# Patient Record
Sex: FEMALE | Race: WHITE | NOT HISPANIC OR LATINO | Employment: OTHER | URBAN - METROPOLITAN AREA
[De-identification: names, ages, dates, MRNs, and addresses within clinical notes are randomized per-mention and may not be internally consistent; named-entity substitution may affect disease eponyms.]

---

## 2017-03-16 ENCOUNTER — ALLSCRIPTS OFFICE VISIT (OUTPATIENT)
Dept: OTHER | Facility: OTHER | Age: 62
End: 2017-03-16

## 2017-03-16 DIAGNOSIS — Z12.31 ENCOUNTER FOR SCREENING MAMMOGRAM FOR MALIGNANT NEOPLASM OF BREAST: ICD-10-CM

## 2017-03-21 ENCOUNTER — LAB CONVERSION - ENCOUNTER (OUTPATIENT)
Dept: OTHER | Facility: OTHER | Age: 62
End: 2017-03-21

## 2017-03-21 LAB
ADDITIONAL INFORMATION (HISTORICAL): NORMAL
ADEQUACY: (HISTORICAL): NORMAL
COMMENT (HISTORICAL): NORMAL
CYTOTECHNOLOGIST: (HISTORICAL): NORMAL
HPV MRNA E6/E7 (HISTORICAL): NOT DETECTED
INTERPRETATION (HISTORICAL): NORMAL
LMP (HISTORICAL): NORMAL
PREV. BX: (HISTORICAL): NORMAL
PREV. PAP (HISTORICAL): NORMAL
SOURCE (HISTORICAL): NORMAL

## 2017-03-24 ENCOUNTER — GENERIC CONVERSION - ENCOUNTER (OUTPATIENT)
Dept: OTHER | Facility: OTHER | Age: 62
End: 2017-03-24

## 2017-09-12 ENCOUNTER — HOSPITAL ENCOUNTER (OUTPATIENT)
Dept: MAMMOGRAPHY | Facility: MEDICAL CENTER | Age: 62
Discharge: HOME | End: 2017-09-12
Payer: COMMERCIAL

## 2017-09-12 DIAGNOSIS — Z12.31 ENCOUNTER FOR SCREENING MAMMOGRAM FOR MALIGNANT NEOPLASM OF BREAST: ICD-10-CM

## 2017-09-12 PROCEDURE — 77063 BREAST TOMOSYNTHESIS BI: CPT

## 2017-09-12 PROCEDURE — G0202 SCR MAMMO BI INCL CAD: HCPCS

## 2018-01-11 NOTE — RESULT NOTES
Verified Results  * DXA BONE DENSITY SPINE HIP AND PELVIS [id number] 70:68OK Harriet Osgood Order Number: [id number]     Test Name Result Flag Reference   DXA BONE DENSITY SPINE HIP AND PELVIS (Report)     CENTRAL DXA SCAN     CLINICAL HISTORY:  63year old post-menopausal  female risk factors include personal history osteopenia  TECHNIQUE: Bone densitometry was performed using a [employer] [place] C bone densitometer  Regions of interest appear properly placed  There are no obvious fractures or other confounding variables which could limit the study  Significant degenerative or    osteoarthritic changes are noted on the spine image in particular at L4 and less so at L3      COMPARISON: Baseline     RESULTS:    LUMBAR SPINE: L1-L3:   BMD 0 822 gm/cm2   T-score below normal, -1 8 L1 and L2 measure at -2  3    Z-score -0 3     LEFT TOTAL HIP:   BMD 0 807 gm/cm2   T-score below normal, -1 1   Z-score -0 1     LEFT FEMORAL NECK:   BMD 0 709 gm/cm2   T-score below normal, -1 3   Z-score 0 1     Treatment with Fosamax was maintained from 2010 until 2012 and Boniva from 2015 to the present time           ASSESSMENT:   1  Based on the WHO classification, this study identifies mild to moderate osteopenia and the patient is considered at elevated risk for fracture  2  A daily intake of calcium of at least 1200 mg and vitamin D, 800-1000 IU, as well as weight bearing and muscle strengthening exercise, fall prevention and avoidance of tobacco and excessive alcohol intake as basic preventive measures are recommended  3  Repeat DXA scan in 18-24 months as clinically indicated        WHO CLASSIFICATION:   Normal (a T-score of -1 0 or higher)   Low bone mineral density (a T-score of less than -1 0 but higher than -2 5)   Osteoporosis (a T-score of -2 5 or less)   Severe osteoporosis (a T-score of -2 5 or less with a fragility fracture)      Thank you for allowing us the opportunity to participate in your patient care  The expanded DEXA report will no longer be arriving in your mail  If you desire to view the full report please contact [facility] or access the PACS system       Workstation performed: [id number]     TRIN[facility] SCREENING BILATERAL W 3D & CAD [id number] 91:12KF Marcell Nagel Order Number: [id number]     Test Name Result Flag Reference   MAMMO SCREENING BILATERAL W 3D & CAD (Report)     Patient History:   Patient is postmenopausal    No known family history of cancer  Reductions of both breasts, 2007  Excisional biopsy of the right   breast    Patient is a former smoker  Patient's BMI is 21 1  Reason for exam: screening (asymptomatic)  Mammo Screening Bilateral W DBT and CAD: April 5, 2016 - Check In   #: [de-identified]   2D/3D Procedure   3D views: Bilateral MLO view(s) were taken  2D views: Bilateral CC and XCCL view(s) were taken  Technologist: VICKY Sheikh (VICKY)(M)   Prior study comparison: August 4, 2014, bilateral digital    screening mammogram performed at [place] [address]  May 29, 2013, bilateral ultrasound, performed at   [facility]  May 29, 2013, right breast    unilateral diagnostic mammogram, performed at [facility]  January 21, 2013, bilateral mammogram, performed   at [facility] for [facility]  The breast tissue is heterogeneously dense, potentially limiting    the sensitivity of mammography  Patient risk, included in this    report, assists in determining the appropriate screening regimen    (such as 3-D mammography or the inclusion of automated breast    ultrasound or MRI)  3-D mammography may also remain indicated as    screening  A combination of mediolateral oblique 3-D tomographic   slices as well as standard two-dimensional orthogonal images    were obtained       No dominant soft tissue mass, architectural distortion or    suspicious calcifications are noted  The skin and nipple    contours are within normal limits  No evidence of malignancy  No significant changes   when compared with prior studies  ASSESSMENT: BiRad:1 - Negative     Recommendation:   Routine screening mammogram of both breasts in 1 year  A    reminder letter will be sent  8-10% of cancers will be missed on mammography  Management of a    palpable abnormality must be based on clinical grounds  Patients    will be notified of their results via letter from our facility  Accredited by [employer] of Radiology and FDA  Transcription Location: [facility]: [id number]     Risk Value(s):   Tyrer-Cuzick 10 Year: 3 635%, Tyrer-Cuzick Lifetime: 8 758%,    Myriad Table: 1 5%, COLLINS 5 Year: 2 6%, NCI Lifetime: 12 2%   Signed by:    Cheri Hollis MD   4/5/16

## 2018-01-13 VITALS
DIASTOLIC BLOOD PRESSURE: 66 MMHG | SYSTOLIC BLOOD PRESSURE: 118 MMHG | WEIGHT: 146 LBS | HEIGHT: 68 IN | BODY MASS INDEX: 22.13 KG/M2

## 2018-06-21 ENCOUNTER — ANNUAL EXAM (OUTPATIENT)
Dept: GYNECOLOGY | Facility: CLINIC | Age: 63
End: 2018-06-21
Payer: COMMERCIAL

## 2018-06-21 VITALS
BODY MASS INDEX: 21.82 KG/M2 | DIASTOLIC BLOOD PRESSURE: 62 MMHG | WEIGHT: 144 LBS | HEIGHT: 68 IN | SYSTOLIC BLOOD PRESSURE: 100 MMHG

## 2018-06-21 DIAGNOSIS — Z12.31 ENCOUNTER FOR SCREENING MAMMOGRAM FOR MALIGNANT NEOPLASM OF BREAST: ICD-10-CM

## 2018-06-21 DIAGNOSIS — Z78.0 MENOPAUSE: ICD-10-CM

## 2018-06-21 DIAGNOSIS — Z01.419 ENCOUNTER FOR ANNUAL ROUTINE GYNECOLOGICAL EXAMINATION: Primary | ICD-10-CM

## 2018-06-21 DIAGNOSIS — M85.80 OSTEOPENIA, UNSPECIFIED LOCATION: ICD-10-CM

## 2018-06-21 PROCEDURE — 99396 PREV VISIT EST AGE 40-64: CPT | Performed by: NURSE PRACTITIONER

## 2018-06-21 RX ORDER — RASAGILINE 1 MG/1
TABLET ORAL
COMMUNITY
Start: 2011-08-31 | End: 2020-05-26 | Stop reason: SDUPTHER

## 2018-06-21 RX ORDER — IBUPROFEN 200 MG
200 TABLET ORAL EVERY 6 HOURS
COMMUNITY
End: 2019-06-24

## 2018-06-21 RX ORDER — IBANDRONATE SODIUM 150 MG/1
150 TABLET, FILM COATED ORAL
COMMUNITY
Start: 2014-02-20 | End: 2020-12-11 | Stop reason: SDUPTHER

## 2018-06-21 NOTE — PROGRESS NOTES
Gracie Oneill is a 58 y o  female who presents for annual exam  The patient is post menopausal and voices no complaints today  The patient is sexually active  GYN screening history: last pap: was normal and last mammogram: was normal  The patient is not taking hormone replacement therapy  Patient denies post-menopausal vaginal bleeding      The patient participates in regular exercise: yes  The pt  relates that she has not been taking her Boniva monthly due to the fact that she takes her Parkinson's med prior to getting out of bed  History of abnormal Pap smear: no  Family history of breast cancer: no  Past Medical History:   Diagnosis Date    Menopause     Parkinson disease ([facility] [address] )      Family History   Problem Relation Age of Onset    Leukemia Mother     Diabetes Mother     Emphysema Father     Stroke Father      Past Surgical History:   Procedure Laterality Date    APPENDECTOMY      BACK SURGERY  05/03/2018    COLONOSCOPY      HYSTEROSCOPY W/ ENDOMETRIAL ABLATION      SALPINGOOPHORECTOMY Right      Social History     Social History    Marital status: /Civil Union     Spouse name: N/A    Number of children: N/A    Years of education: N/A     Occupational History    Not on file       Social History Main Topics    Smoking status: Not on file    Smokeless tobacco: Not on file    Alcohol use Yes      Comment: SOCIAL    Drug use: Unknown    Sexual activity: Not on file     Other Topics Concern    Not on file     Social History Narrative    No narrative on file       Current Outpatient Prescriptions:     CALCIUM-VITAMIN D PO, Take by mouth, Disp: , Rfl:     carbidopa-levodopa (SINEMET)  mg per tablet, Take 1 tablet by mouth, Disp: , Rfl:     ibandronate (BONIVA) 150 MG tablet, Take 150 mg by mouth, Disp: , Rfl:     rasagiline (AZILECT) 1 MG, Take by mouth, Disp: , Rfl:     Acetaminophen (TYLENOL) 325 MG CAPS, Tylenol, Disp: , Rfl:     ibuprofen (MOTRIN) 200 mg tablet, Take 200 mg by mouth every 6 (six) hours, Disp: , Rfl:     rotigotine (NEUPRO) 8 MG/24HR transdermal patch, Neupro 8 mg/24 hour transdermal 24 hour patch, Disp: , Rfl:       Review of Systems  Constitutional :no fever, feels well, no tiredness, no recent weight gain or loss  Cardiovascular: no complaints of slow or fast heart beat, no chest pain, no palpitations  Respiratory: no complaints of shortness of shortness of breath, no LE  Breasts:no complaints of breast pain, breast lump, or nipple discharge  Gastrointestinal: no complaints of abdominal pain, constipation,nause, vomiting, or diarrhea or bloody stools  Genitourinary : no complaints of dysuria, incontinence, pelvic pain, dysmenorrhea,vaginal discharge or abnormal vaginal bleeding  Integumentary: no complaints of skin rash or lesion,itching or dry skin  Neurological: no complaints of headache,numbness, tingling, dizziness or fainting       Objective      /62   Ht 5' 8 2" (1 732 m)   Wt 65 3 kg (144 lb)   BMI 21 77 kg/m²     General:   appears stated age","cooperative","alert" normal mood and affect   Neck: Neck: normal, supple,trachea midline, no masses   Heart: regular rate and rhythm, S1, S2 normal, no murmur, click, rub or gallop   Lungs: clear to auscultation bilaterally   Breasts: Breast exam :normal, no dimpling or skin changes noted   Abdomen: soft, non-tender, without masses or organomegaly   Vulva: Normal , no lesions   Vagina: normal , no lesions  Mild atrophy   Urethra: normal   Cervix: Normal, no palpable masses A pap smear was not done   Uterus: Normal , non-tender,not enlarged,no palpable masses   Adnexa: Normal, non-tender without fullness or masses                         Assessment   Normal GYN exam  Osteopenia       Plan      All questions answered  Breast self exam technique reviewed and patient encouraged to perform self-exam monthly  Dietary diary  Follow up as needed    Mammogram   The pt  will have a repeat Dexa scan done  Once those results are available we will decide on whether or not she should continue with the use of Boniva monthly  She will be sure to take in 1200 mg  Calcium with 1000 IU Vit D  The pt  Iis UTD with her colonoscopy and iwll due for a repeat in 5 - 6 yrs

## 2018-10-01 ENCOUNTER — HOSPITAL ENCOUNTER (OUTPATIENT)
Dept: BONE DENSITY | Facility: MEDICAL CENTER | Age: 63
Discharge: HOME | End: 2018-10-01
Payer: COMMERCIAL

## 2018-10-01 ENCOUNTER — HOSPITAL ENCOUNTER (OUTPATIENT)
Dept: MAMMOGRAPHY | Facility: MEDICAL CENTER | Age: 63
Discharge: HOME | End: 2018-10-01
Payer: COMMERCIAL

## 2018-10-01 DIAGNOSIS — Z12.31 ENCOUNTER FOR SCREENING MAMMOGRAM FOR MALIGNANT NEOPLASM OF BREAST: ICD-10-CM

## 2018-10-01 DIAGNOSIS — Z78.0 MENOPAUSE: ICD-10-CM

## 2018-10-01 DIAGNOSIS — M85.80 OSTEOPENIA, UNSPECIFIED LOCATION: ICD-10-CM

## 2018-10-01 PROCEDURE — 77063 BREAST TOMOSYNTHESIS BI: CPT

## 2018-10-01 PROCEDURE — 77067 SCR MAMMO BI INCL CAD: CPT

## 2018-10-01 PROCEDURE — 77080 DXA BONE DENSITY AXIAL: CPT

## 2018-10-09 ENCOUNTER — HOSPITAL ENCOUNTER (OUTPATIENT)
Dept: MAMMOGRAPHY | Facility: CLINIC | Age: 63
Discharge: HOME | End: 2018-10-09
Payer: COMMERCIAL

## 2018-10-09 ENCOUNTER — HOSPITAL ENCOUNTER (OUTPATIENT)
Dept: ULTRASOUND IMAGING | Facility: CLINIC | Age: 63
Discharge: HOME | End: 2018-10-09
Payer: COMMERCIAL

## 2018-10-09 DIAGNOSIS — R92.8 ABNORMAL MAMMOGRAM: ICD-10-CM

## 2018-10-09 PROCEDURE — G0279 TOMOSYNTHESIS, MAMMO: HCPCS

## 2018-10-09 PROCEDURE — 76642 ULTRASOUND BREAST LIMITED: CPT

## 2018-10-09 PROCEDURE — 77065 DX MAMMO INCL CAD UNI: CPT

## 2019-03-18 ENCOUNTER — TELEPHONE (OUTPATIENT)
Dept: GYNECOLOGY | Facility: CLINIC | Age: 64
End: 2019-03-18

## 2019-03-18 DIAGNOSIS — R92.8 ABNORMAL MAMMOGRAM: Primary | ICD-10-CM

## 2019-03-18 NOTE — TELEPHONE ENCOUNTER
Patient had Mammo performed on 10/09/18, was recommended 6 Month follow  Will need Mammo diagnostic order / [address] as needed

## 2019-04-05 ENCOUNTER — HOSPITAL ENCOUNTER (OUTPATIENT)
Dept: ULTRASOUND IMAGING | Facility: CLINIC | Age: 64
Discharge: HOME | End: 2019-04-05
Payer: MEDICARE

## 2019-04-05 ENCOUNTER — HOSPITAL ENCOUNTER (OUTPATIENT)
Dept: MAMMOGRAPHY | Facility: CLINIC | Age: 64
Discharge: HOME | End: 2019-04-05
Payer: MEDICARE

## 2019-04-05 VITALS — HEIGHT: 68 IN | WEIGHT: 144 LBS | BODY MASS INDEX: 21.82 KG/M2

## 2019-04-05 DIAGNOSIS — R92.8 ABNORMAL MAMMOGRAM: ICD-10-CM

## 2019-04-05 PROCEDURE — G0279 TOMOSYNTHESIS, MAMMO: HCPCS

## 2019-04-05 PROCEDURE — 77065 DX MAMMO INCL CAD UNI: CPT

## 2019-06-24 ENCOUNTER — ANNUAL EXAM (OUTPATIENT)
Dept: GYNECOLOGY | Facility: CLINIC | Age: 64
End: 2019-06-24
Payer: MEDICARE

## 2019-06-24 VITALS
WEIGHT: 143.4 LBS | BODY MASS INDEX: 21.73 KG/M2 | HEIGHT: 68 IN | HEART RATE: 92 BPM | SYSTOLIC BLOOD PRESSURE: 122 MMHG | DIASTOLIC BLOOD PRESSURE: 76 MMHG

## 2019-06-24 DIAGNOSIS — Z12.31 ENCOUNTER FOR SCREENING MAMMOGRAM FOR MALIGNANT NEOPLASM OF BREAST: ICD-10-CM

## 2019-06-24 DIAGNOSIS — Z01.419 ENCOUNTER FOR GYNECOLOGICAL EXAMINATION (GENERAL) (ROUTINE) WITHOUT ABNORMAL FINDINGS: Primary | ICD-10-CM

## 2019-06-24 PROCEDURE — G0101 CA SCREEN;PELVIC/BREAST EXAM: HCPCS | Performed by: OBSTETRICS & GYNECOLOGY

## 2019-11-06 ENCOUNTER — TELEPHONE (OUTPATIENT)
Dept: NEUROLOGY | Facility: CLINIC | Age: 64
End: 2019-11-06

## 2019-11-06 NOTE — TELEPHONE ENCOUNTER
Consult 1-17-20 2:30pm w/Dr Alex Pines Budinetz/Parkinson/Medicare A & B / Downstream life  Np pack sent

## 2019-11-18 ENCOUNTER — HOSPITAL ENCOUNTER (OUTPATIENT)
Dept: MAMMOGRAPHY | Facility: MEDICAL CENTER | Age: 64
Discharge: HOME | End: 2019-11-18
Payer: MEDICARE

## 2019-11-18 VITALS — BODY MASS INDEX: 21.22 KG/M2 | WEIGHT: 140 LBS | HEIGHT: 68 IN

## 2019-11-18 DIAGNOSIS — Z12.31 ENCOUNTER FOR SCREENING MAMMOGRAM FOR MALIGNANT NEOPLASM OF BREAST: ICD-10-CM

## 2019-11-18 PROCEDURE — 77067 SCR MAMMO BI INCL CAD: CPT

## 2019-11-18 PROCEDURE — 77063 BREAST TOMOSYNTHESIS BI: CPT

## 2019-11-19 ENCOUNTER — TELEPHONE (OUTPATIENT)
Dept: GYNECOLOGY | Facility: CLINIC | Age: 64
End: 2019-11-19

## 2019-11-19 NOTE — TELEPHONE ENCOUNTER
LM on VM to [facility] - Mammography normal however she had an elevated lifetime Tyrer Cuzick risk assessment of 33 83% and should be offered referral to Dr Derick Larsen

## 2019-11-20 ENCOUNTER — TELEPHONE (OUTPATIENT)
Dept: GYNECOLOGY | Facility: CLINIC | Age: 64
End: 2019-11-20

## 2019-11-20 DIAGNOSIS — Z80.3 FAMILY HISTORY OF BREAST CANCER: Primary | ICD-10-CM

## 2019-11-20 NOTE — TELEPHONE ENCOUNTER
Spoke with patient regarding elevated Tyrer-Cuzick lifetime risk and patient is agreeable to appointment with Dr Neeta Azevedo

## 2019-11-22 ENCOUNTER — TELEPHONE (OUTPATIENT)
Dept: SURGICAL ONCOLOGY | Facility: CLINIC | Age: 64
End: 2019-11-22

## 2019-11-22 NOTE — TELEPHONE ENCOUNTER
New Patient Encounter    New Patient Intake Form   Patient Details:  Abad Santana  1955  [id number]    Background Information:  [address] starts by opening a telephone encounter and gathering the following information   Who is calling to schedule? If not self, relationship to patient? self   Referring Provider Esvin Bailey   What is the diagnosis? Elevated tyrer naa risk   When was the diagnosis? 11/2019   Is patient aware of diagnosis? Yes   Reason for visit? NP DX   Have you had any testing done? If so: when, where? Yes   Are records in edo? yes   Was the patient told to bring a disk? no   Scheduling Information:   Preferred [place]:  [place]     Requesting Specific Provider? isa   Are there any dates/time the patient cannot be seen? no      Miscellaneous: n/a   After completing the above information, please route to Financial Counselor and the appropriate Nurse Navigator for review

## 2020-01-06 ENCOUNTER — TELEPHONE (OUTPATIENT)
Dept: HEMATOLOGY ONCOLOGY | Facility: CLINIC | Age: 65
End: 2020-01-06

## 2020-01-06 NOTE — TELEPHONE ENCOUNTER
[place] called in to cancel her appointment with Jerri Cardoso on the 20th  Said she does not feel the need for the appointment right now

## 2020-01-16 NOTE — PROGRESS NOTES
DEPARTMENT OF NEUROLOGICAL SCIENCES  [address] and [facility] CLINIC        NEW PATIENT EVALUATION NOTE    Patient: [address] Record Number: # [id number]  YOB: 1955  Date of visit: 1/17/2020    Referring provider: Ofelia Cormier MD    ASSESSMENT     Diagnoses for this encounter:  1  Parkinson's disease ([address] )       Impression of this 70 yo lady here for transfer of care for 11+ year history of idiopathic Parkinson's disease, finding benefit on Sinemet / rasagiline / rotigotine combination for years  She has more rigidity than tremor and little OFF time with her current regimen  However she feels she has increased cramping pain of late, having had no follow-up in a year  UPDRSIII of 14  Overall she is doing relatively well considering her duration of disease and with few non-motor symptoms  We refilled the Neupro patch for her, as that was something she was able to get for a low cost while she was at [facility]  Rather than increasing medications, we will focus on return of physical activity she had voluntarily reduced several months ago, including stretching for the affected rigidity  In the future given her age, we may consider amantadine as next step, with benefit to her dyskinesias  Rest as below  PLAN     · Reviewed recent blood test results from [facility] as normal CBC, CMP, TSH  No new testing right now needed  · No recent or relevant neuroimaging results to review in [facility]  She had MRI L-spine in 2018 with L foraminal severe stenosis at L4-L5 post laminectomy  · We discussed about focusing on stretching exercises, suggested heating pads and tonic water to assist with cramping  Her ON OFF fluctuations are mild and we will otherwise continue the current regimen she has unchanged (Sinemet, Neupro, and Azilect)  · We have her registered on the Neupro  com site, with a patient savings card emailed to her   She will let us know if she encounters problems with filling it out  · Thank you very much for sending me this interesting patient  · The patient has been instructed to call us about any new neurological problems or medication side effects  · Return to Clinic in 4 months  A total of 60 minutes were spent face-to-face with this patient, of which 25% was spent on counseling and coordination of care  We discussed the natural history of the patient's condition, differential diagnosis, level of diagnostic certainty, treatment alternatives and their side effects and possible complications  HISTORY OF PRESENT ILLNESS:     Ms Eulogio Hardy is a 71 y o  right handed female who has been referred to the [facility] [address] [place] [address] for transfer of care evaluation of parkinsonism  The patient was not accompanied today  History was obtained from patient   Referred from PCP    Patient had been established for many years at [facility] and saw several neurologists there including Dr Woo Barrett and Dr Patsy Stafford  Her last office visit was in 12/2018 and Marjorie Leatha was 6  Per review, her first symptom was R hand tremor since 2009, also with rigidity and pain, limping on R side when walking  A Dr Arsh Valencia started Azilect at first and then a Dr Jen Monsivais began Sinemet, which helped the [facility] tremor  She is on sinemet 25/100 1 tab at 8AM-12 noon-4PM-8PM- 1/2 tab at 12 AM  She feels the medication may not last for four hours  She is weak and tremor during off time  She is about 2-3 hours of off time total in a day  She is slightly having dyskinesia in between  She is not feeling discomfortable  Historically, what bothers her the most was frequent wearing off in between  Dyskinesia is not bothersome  She was started on Neupro patch due to the motor fluctuation  She did not take it until a few weeks ago  She is now at 8mg per day  No major problems  She also feels the motor fluctuation is less       Today she tells me that now she has begun to notice more of the aches, cramps, and rigidity coming back  She says she has been off exercise for the "last couple to months"  She had a recent bike trip in [facility] but had taken a hiatus afterwards  She denies feeling significant freezing of gait but does some shuffle  She denies impulsivity and hallucinations  She does feel some increased leg swelling at the end of the day  She had two back surgeries in the last three years which did set her back temporarily in her parkinson's symptoms  She does have some ON OFF periods, with some rigidity more than tremor during OFF  Current Relevant Medications:  Sinemet 25/100, Boniva, Calcium, rasagiline, rotigotine patch  Name of Med 7am 11pm 3pm 7pm  12am     Sinemet 25/100 IR 1 tab 1 tab 1 tab 1 tab  0 5 optional      Azilect 1mg 1 tab           Rotigotine 8mg/24hr 1 patch            Her doses usually last up to the following dose with little gap time  It takes 20+ minutes to kick in   She feels the 11am time period is usually when she feels the worst      REVIEW OF PAST MEDICAL, SOCIAL AND FAMILY HISTORY:  This is the list of problems as per our Medical Records:    Patient Active Problem List    Diagnosis Date Noted    Parkinson's disease ([facility] [address] ) 01/17/2020    Encounter for annual routine gynecological examination 06/21/2018     Past Medical History:   Diagnosis Date    Menopause     Parkinson disease [facility])       Past Surgical History:   Procedure Laterality Date    APPENDECTOMY      BACK SURGERY  05/03/2018    COLONOSCOPY      HYSTEROSCOPY W/ ENDOMETRIAL ABLATION      MAMMO STEREOTACTIC BREAST BIOPSY RIGHT (ALL INC) Right     benign    REDUCTION MAMMAPLASTY Bilateral     2007    SALPINGOOPHORECTOMY Right       Allergies   Allergen Reactions    Amoxicillin Hives    Penicillins Hives    Sulfa Antibiotics Hives      Outpatient Encounter Medications as of 1/17/2020   Medication Sig Dispense Refill    CALCIUM-VITAMIN D PO Take by mouth      carbidopa-levodopa (SINEMET)  mg per tablet Take 1 tablet by mouth 1 tablet at 7 am, 1 tablet at 11 am, 1 tablet at 3 pm, 1 tablet at 7 pm, 1/2 tablet at bed time   rasagiline (AZILECT) 1 MG Take by mouth      [DISCONTINUED] rotigotine (NEUPRO) 8 MG/24HR transdermal patch Neupro 8 mg/24 hour transdermal 24 hour patch      ibandronate (BONIVA) 150 MG tablet Take 150 mg by mouth       No facility-administered encounter medications on file as of 1/17/2020  Social History     Tobacco Use    Smoking status: Former Smoker    Smokeless tobacco: Never Used   Substance Use Topics    Alcohol use: Yes     Comment: SOCIAL     Family History   Problem Relation Age of Onset    Leukemia Mother     Diabetes Mother     Emphysema Father     Stroke Father     No Known Problems Sister     No Known Problems Daughter     No Known Problems Maternal Grandmother     No Known Problems Maternal Grandfather     No Known Problems Paternal Grandmother     No Known Problems Paternal Grandfather     Breast cancer Sister 54    Breast cancer Sister 54    No Known Problems Maternal Aunt     Cancer Maternal Aunt     No Known Problems Paternal Aunt     No Known Problems Paternal Aunt     No Known Problems Paternal Aunt         REVIEW OF SYSTEMS:  The patient has entered data on an intake form regarding present illness, past medical and surgical history, medications, allergies, family and social history, and a full review of 14 systems  I have reviewed this form with the patient, and all the relevant information has been included on this note  The full review of systems was negative except as stated in HPI and below  Constitutional: Negative  Negative for appetite change and fever  HENT: Negative  Negative for hearing loss, tinnitus, trouble swallowing and voice change  Eyes: Negative  Negative for photophobia and pain  Positive for dry eyes     Respiratory: Negative  Negative for shortness of breath  Cardiovascular: Negative    Negative for palpitations  Gastrointestinal: Negative  Negative for nausea and vomiting  Endocrine: Negative  Negative for cold intolerance and heat intolerance  Genitourinary: Negative  Negative for dysuria, frequency and urgency  Musculoskeletal: Negative  Negative for myalgias and neck pain  Skin: Negative  Negative for rash  Neurological: Positive for tremors  Negative for dizziness, seizures, syncope, facial asymmetry, speech difficulty, weakness, light-headedness, numbness and headaches  Positive for cramping     Hematological: Negative  Does not bruise/bleed easily  Psychiatric/Behavioral: Negative  Negative for confusion, hallucinations and sleep disturbance  PHYSICAL EXAMINATION:     Vital signs:  BP (!) 87/58 (BP Location: Left arm, Patient Position: Standing, Cuff Size: Large)   Pulse 87   Ht 5' 8" (1 727 m)   Wt 63 5 kg (140 lb)   LMP  (LMP Unknown)   BMI 21 29 kg/m²   Orthostatic BP as sitting 107/56 pulse 72, standing 87/58 with pulse 87    General: Mild hypomimia  Well-appearing, well nourished, pleasant patient in no acute distress  Mood appropriate  Head:  Normocephalic, atraumatic  Oropharynx and conjunctiva are clear  Speech  No hypophonia, no bradylalia  No scanning speech  Language: Comprehension intact  Neck:  Supple, strong 5/5 forward flexion and retroflexion  Extremities: Range of motion is normal       Cognitive and Mental Exam:  The patient is alert, oriented to self, location, date and situation  Memory is normal to provide accurate details of health history     Cranial Nerves:  CN II:  Direct and consensual light reflexes were equally reactive to light symmetrically  No afferent pupillary defect   Visual fields are full to confrontation  CN III / IV / VI: Extraocular movements were full, with normal pursuit and saccades  CN V:   Facial sensation to light touch was intact  CN VII: Face is symmetric with normal strength     CN VIII: Hearing was assessed using the Calibrated Finger Rub Auditory Screening Test (CALFRAST) and was not abnormal (Better than CALFRAST-Strong-70)  CN X:   Palate is up going bilaterally and symmetrically  CN XI:  Neck muscles are strong  CN XII: Tongue protrusion is at midline with normal movements  No dysarthria  Motor:    Dystonia: none  Dyskinesia: yes full body  Myoclonus: none  Chorea: none  Tics: none       UPDRSIII                Time since last dose:   1/17/20  At usual OFF time      Speech  0     Facial Expression  1    Postural Tremor (Right) 1    Postural Tremor (Left) 1    Kinetic Tremor (Right)  0    Kinetic Tremor (Left)  0    Rest tremor amplitude RUE 0    Rest tremor amplitude LUE 0    Rest tremor amplitude RLE 0    Rest tremor amplitude LLE 0    Lip/Jaw Tremor  0    Consistency of tremor 0    Finger Taps (Right)   0    Finger Taps (Left)  1    Hand Movement (Right)  0    Hand Movement (Left)   0    Pronation/Supination (Right)  1    Pronation/Supination (Left)   1    Toe Tapping (Right) 0    Toe Tapping (Left) 2    Leg Agility (Right)  0    Leg Agility (Left)   0     Rigidity - Neck  2     Rigidity - Upper Extremity (Right)  3      Rigidity - Upper Extremity (Left)   1     Rigidity - Lower Extremity (Right)  0    Rigidity - Lower Extremity (Left)   0    Arising from Chair   0      Gait   0     Freezing of Gait 0     Postural Stability  0     Posture 0     Global spontaneity of movement 0       -------------------------------------------------------------------------------------    Muscle Strength Right Left  Muscle Strength Right Left   Deltoid 5/5 5/5  Hip Adductors 5/5 5/5   Biceps 5/5 5/5  Hip Abductors 5/5 5/5   Triceps 5/5 5/5  Knee Extensors 5/5 5/5   Wrist Extensors 5/5 5/5  Knee Flexors 5/5 5/5   Wrist Flexors 5/5 5/5  Ankle Extensors 5/5 5/5    5/5 5/5  Ankle Flexors 5/5 5/5   Finger Abductors 5/5 5/5       Hip Flexors 5/5 5/5   Hip Extensors 5/5 5/5     Sensory  Intact to Light Touch, Temperature, and vibration sense in all extremities  Coordination:  Finger-to-nose-finger: normal     Gait:  Normal speed and rise from bench, symmetrical arm swing, no resting tremor, narrow base and normal turns  Pull test of 0 (one step back)        Reflexes:    Right Left   Biceps 2/4 2/4   Brachioradialis 2/4 2/4   Triceps 2/4 2/4   Knee 2/4 2/4   Ankle 2/4 2/4      Plantar cutaneous reflex:  Right: flexor  Left: flexor

## 2020-01-17 ENCOUNTER — CONSULT (OUTPATIENT)
Dept: NEUROLOGY | Facility: CLINIC | Age: 65
End: 2020-01-17
Payer: MEDICARE

## 2020-01-17 VITALS
DIASTOLIC BLOOD PRESSURE: 58 MMHG | HEART RATE: 87 BPM | SYSTOLIC BLOOD PRESSURE: 87 MMHG | WEIGHT: 140 LBS | BODY MASS INDEX: 21.22 KG/M2 | HEIGHT: 68 IN

## 2020-01-17 DIAGNOSIS — G20 PARKINSON DISEASE (HCC): Primary | ICD-10-CM

## 2020-01-17 DIAGNOSIS — G20 PARKINSON'S DISEASE (HCC): Primary | ICD-10-CM

## 2020-01-17 PROBLEM — G20.A1 PARKINSON'S DISEASE: Status: ACTIVE | Noted: 2020-01-17

## 2020-01-17 PROCEDURE — 99204 OFFICE O/P NEW MOD 45 MIN: CPT | Performed by: PSYCHIATRY & NEUROLOGY

## 2020-01-17 NOTE — PATIENT INSTRUCTIONS
· Reviewed recent blood test results from [facility] as normal CBC, CMP, TSH  No new testing right now needed  · No recent or relevant neuroimaging results to review in [facility]  She had MRI L-spine in 2018 with L foraminal severe stenosis at L4-L5 post laminectomy  · We discussed about focusing on stretching exercises, suggested heating pads and tonic water to assist with cramping  Her ON OFF fluctuations are mild and we will otherwise continue the current regimen she has unchanged (Sinemet, Neupro, and Azilect)  · We have her registered on the [employer]  com site, with a patient savings card emailed to her  She will let us know if she encounters problems with filling it out  · Thank you very much for sending me this interesting patient  · The patient has been instructed to call us about any new neurological problems or medication side effects  · Return to Clinic in 4 months

## 2020-01-17 NOTE — LETTER
January 17, 2020     Julia Medrano MD  [facility],Third Floor, [address] [place] [address]    Patient: Brook Cisse   YOB: 1955   Date of Visit: 1/17/2020       Dear Dr Zeus Davis: Thank you for referring Lashay Mcdonnell to me for evaluation  Below are my notes for this consultation  If you have questions, please do not hesitate to call me  I look forward to following your patient along with you  Sincerely,        Keyshawn Page MD        CC: No Recipients  Keyshawn Page MD  1/17/2020  7:54 PM  Incomplete  DEPARTMENT OF NEUROLOGICAL SCIENCES  ST [address] [place] [address] and [facility]        NEW PATIENT EVALUATION NOTE    Patient: [address] [place] Record Number: # [id number]  YOB: 1955  Date of visit: 1/17/2020    Referring provider: Dyana Villa MD    ASSESSMENT     Diagnoses for this encounter:  1  Parkinson's disease ([address] )       Impression of this 72 yo lady here for transfer of care for 11+ year history of idiopathic Parkinson's disease, finding benefit on Sinemet / rasagiline / rotigotine combination for years  She has more rigidity than tremor and little OFF time with her current regimen  However she feels she has increased cramping pain of late, having had no follow-up in a year  UPDRSIII of 14  Overall she is doing relatively well considering her duration of disease and with few non-motor symptoms  We refilled the Neupro patch for her, as that was something she was able to get for a low cost while she was at [facility]  Rather than increasing medications, we will focus on return of physical activity she had voluntarily reduced several months ago, including stretching for the affected rigidity  In the future given her age, we may consider amantadine as next step, with benefit to her dyskinesias  Rest as below  PLAN     · Reviewed recent blood test results from [facility] as normal CBC, CMP, TSH   No new testing right now needed  · No recent or relevant neuroimaging results to review in [facility]  She had MRI L-spine in 2018 with L foraminal severe stenosis at L4-L5 post laminectomy  · We discussed about focusing on stretching exercises, suggested heating pads and tonic water to assist with cramping  Her ON OFF fluctuations are mild and we will otherwise continue the current regimen she has unchanged (Sinemet, Neupro, and Azilect)  · We have her registered on the Neupro  com site, with a patient savings card emailed to her  She will let us know if she encounters problems with filling it out  · Thank you very much for sending me this interesting patient  · The patient has been instructed to call us about any new neurological problems or medication side effects  · Return to Clinic in 4 months  A total of 60 minutes were spent face-to-face with this patient, of which 25% was spent on counseling and coordination of care  We discussed the natural history of the patient's condition, differential diagnosis, level of diagnostic certainty, treatment alternatives and their side effects and possible complications  HISTORY OF PRESENT ILLNESS:     Ms Ramona Mercado is a 71 y o  right handed female who has been [facility] to [facility] [address] [place] [address] for transfer of care evaluation of parkinsonism  The patient was not accompanied today  History was obtained from patient   Referred from PCP    Patient had been established for many years at [facility] AT THE [facility] and saw several neurologists there including Dr Candie Lo and Dr Mariama Hernandez  Her last office visit was in 12/2018 and Billye Post was 6  Per review, her first symptom was R hand tremor since 2009, also with rigidity and pain, limping on R side when walking  A Dr Don Caraballo started Azilect at first and then a Dr Adrian Sanon began Sinemet, [facility]       She is on sinemet 25/100 1 tab at 8AM-12 noon-4PM-8PM- 1/2 tab at 12 AM  She feels the medication may not last for four hours  She is weak and tremor during off time  She is about 2-3 hours of off time total in a day  She is slightly having dyskinesia in between  She is not feeling discomfortable  Historically, what bothers her the most was frequent wearing off in between  Dyskinesia is not bothersome  She was started on Neupro patch due to the motor fluctuation  She did not take it until a few weeks ago  She is now at 8mg per day  No major problems  She also feels the motor fluctuation is less  Today she tells me that now she has begun to notice more of the aches, cramps, and rigidity coming back  She says she has been off exercise for the "last couple to months"  She had a recent bike trip in [facility] but had taken a hiatus afterwards  She denies feeling significant freezing of gait but does some shuffle  She denies impulsivity and hallucinations  She does feel some increased leg swelling at the end of the day  She had two back surgeries in the last three years which did set her back temporarily in her parkinson's symptoms  She does have some ON OFF periods, with some rigidity more than tremor during OFF  Current Relevant Medications:  Sinemet 25/100, Boniva, Calcium, rasagiline, rotigotine patch  Name of Med 7am 11pm 3pm 7pm  12am     Sinemet 25/100 IR 1 tab 1 tab 1 tab 1 tab  0 5 optional      Azilect 1mg 1 tab           Rotigotine 8mg/24hr 1 patch            Her doses usually last up to the following dose with little gap time  It takes 20+ minutes to kick in   She feels the 11am time period is usually when she feels the worst      REVIEW OF PAST MEDICAL, SOCIAL AND FAMILY HISTORY:  This is the list of problems as per our Medical Records:    Patient Active Problem List    Diagnosis Date Noted    Parkinson's disease ([facility] [address] [facility] ) 01/17/2020    Encounter for annual routine gynecological examination 06/21/2018     Past Medical History:   Diagnosis Date    Menopause     Parkinson disease [facility])       Past Surgical History:   Procedure Laterality Date    APPENDECTOMY      BACK SURGERY  05/03/2018    COLONOSCOPY      HYSTEROSCOPY W/ ENDOMETRIAL ABLATION      MAMMO STEREOTACTIC BREAST BIOPSY RIGHT (ALL INC) Right     benign    REDUCTION MAMMAPLASTY Bilateral     2007    SALPINGOOPHORECTOMY Right       Allergies   Allergen Reactions    Amoxicillin Hives    Penicillins Hives    Sulfa Antibiotics Hives      Outpatient Encounter Medications as of 1/17/2020   Medication Sig Dispense Refill    CALCIUM-VITAMIN D PO Take by mouth      carbidopa-levodopa (SINEMET)  mg per tablet Take 1 tablet by mouth 1 tablet at 7 am, 1 tablet at 11 am, 1 tablet at 3 pm, 1 tablet at 7 pm, 1/2 tablet at bed time   rasagiline (AZILECT) 1 MG Take by mouth      [DISCONTINUED] rotigotine (NEUPRO) 8 MG/24HR transdermal patch Neupro 8 mg/24 hour transdermal 24 hour patch      ibandronate (BONIVA) 150 MG tablet Take 150 mg by mouth       No facility-administered encounter medications on file as of 1/17/2020        Social History     Tobacco Use    Smoking status: Former Smoker    Smokeless tobacco: Never Used   Substance Use Topics    Alcohol use: Yes     Comment: SOCIAL     Family History   Problem Relation Age of Onset    Leukemia Mother     Diabetes Mother     Emphysema Father     Stroke Father     No Known Problems Sister     No Known Problems Daughter     No Known Problems Maternal Grandmother     No Known Problems Maternal Grandfather     No Known Problems Paternal Grandmother     No Known Problems Paternal Grandfather     Breast cancer Sister 54    Breast cancer Sister 54    No Known Problems Maternal Aunt     Cancer Maternal Aunt     No Known Problems Paternal Aunt     No Known Problems Paternal Aunt     No Known Problems Paternal Aunt         REVIEW OF SYSTEMS:  The patient has entered data on an intake form regarding present illness, past medical and surgical history, medications, allergies, family and social history, and a full review of 14 systems  I have reviewed this form with the patient, and all the relevant information has been included on this note  The full review of systems was negative except as stated in HPI and below  Constitutional: Negative  Negative for appetite change and fever  HENT: Negative  Negative for hearing loss, tinnitus, trouble swallowing and voice change  Eyes: Negative  Negative for photophobia and pain  Positive for dry eyes     Respiratory: Negative  Negative for shortness of breath  Cardiovascular: Negative  Negative for palpitations  Gastrointestinal: Negative  Negative for nausea and vomiting  Endocrine: Negative  Negative for cold intolerance and heat intolerance  Genitourinary: Negative  Negative for dysuria, frequency and urgency  Musculoskeletal: Negative  Negative for myalgias and neck pain  Skin: Negative  Negative for rash  Neurological: Positive for tremors  Negative for dizziness, seizures, syncope, facial asymmetry, speech difficulty, weakness, light-headedness, numbness and headaches  Positive for cramping     Hematological: Negative  Does not bruise/bleed easily  Psychiatric/Behavioral: Negative  Negative for confusion, hallucinations and sleep disturbance  PHYSICAL EXAMINATION:     Vital signs:  BP (!) 87/58 (BP Location: Left arm, Patient Position: Standing, Cuff Size: Large)   Pulse 87   Ht 5' 8" (1 727 m)   Wt 63 5 kg (140 lb)   LMP  (LMP Unknown)   BMI 21 29 kg/m²    Orthostatic BP as sitting 107/56 pulse 72, standing 87/58 with pulse 87    General: Mild hypomimia  Well-appearing, well nourished, pleasant patient in no acute distress  Mood appropriate  Head:  Normocephalic, atraumatic  Oropharynx and conjunctiva are clear  Speech  No hypophonia, no bradylalia  No scanning speech  Language: Comprehension intact  Neck:  Supple, strong 5/5 forward flexion and retroflexion     Extremities: Range of motion is normal       Cognitive and Mental Exam:  The patient is alert, oriented to self, location, date and situation  Memory is normal to provide accurate details of health history     Cranial Nerves:  CN II:  Direct and consensual light reflexes were equally reactive to light symmetrically  No afferent pupillary defect   Visual fields are full to confrontation  CN III / IV / VI: Extraocular movements were full, with normal pursuit and saccades  CN V:   Facial sensation to light touch was intact  CN VII: Face is symmetric with normal strength  CN VIII: Hearing was assessed using the Calibrated Finger Rub Auditory Screening Test (CALFRAST) and was not abnormal (Better than CALFRAST-Strong-70)  CN X:   Palate is up going bilaterally and symmetrically  CN XI:  Neck muscles are strong  CN XII: Tongue protrusion is at midline with normal movements  No dysarthria  Motor:    Dystonia: none  Dyskinesia: yes full body  Myoclonus: none  Chorea: none  Tics: none       UPDRSIII                Time since last dose:    1/17/20  At usual OFF time      Speech  0     Facial Expression  1    Postural Tremor (Right) 1    Postural Tremor (Left) 1    Kinetic Tremor (Right)  0    Kinetic Tremor (Left)  0    Rest tremor amplitude RUE 0    Rest tremor amplitude LUE 0    Rest tremor amplitude RLE 0    Rest tremor amplitude LLE 0    Lip/Jaw Tremor  0    Consistency of tremor 0    Finger Taps (Right)   0    Finger Taps (Left)  1    Hand Movement (Right)  0    Hand Movement (Left)   0    Pronation/Supination (Right)  1    Pronation/Supination (Left)   1    Toe Tapping (Right) 0    Toe Tapping (Left) 2    Leg Agility (Right)  0    Leg Agility (Left)   0     Rigidity - Neck  2     Rigidity - Upper Extremity (Right)  3      Rigidity - Upper Extremity (Left)   1     Rigidity - Lower Extremity (Right)  0    Rigidity - Lower Extremity (Left)   0    Arising from Chair   0      Gait    0     Freezing of Gait 0     Postural Stability  0     Posture 0     Global spontaneity of movement 0       -------------------------------------------------------------------------------------    Muscle Strength Right Left  Muscle Strength Right Left   Deltoid 5/5 5/5  Hip Adductors 5/5 5/5   Biceps 5/5 5/5  Hip Abductors 5/5 5/5   Triceps 5/5 5/5  Knee Extensors 5/5 5/5   Wrist Extensors 5/5 5/5  Knee Flexors 5/5 5/5   Wrist Flexors 5/5 5/5  Ankle Extensors 5/5 5/5    5/5 5/5  Ankle Flexors 5/5 5/5   Finger Abductors 5/5 5/5       Hip Flexors 5/5 5/5   Hip Extensors 5/5 5/5     Sensory  Intact to Light Touch, Temperature, and vibration sense in all extremities  Coordination:  Finger-to-nose-finger: normal     Gait:  Normal speed and rise from bench, symmetrical arm swing, no resting tremor, narrow base and normal turns  Pull test of 0 (one step back)  Reflexes:    Right Left   Biceps 2/4 2/4   Brachioradialis 2/4 2/4   Triceps 2/4 2/4   Knee 2/4 2/4   Ankle 2/4 2/4      Plantar cutaneous reflex:  Right: flexor  Left: flexor      Bandar Wang  1/17/2020  2:33 PM  Sign at close encounter  Review of Systems   Constitutional: Negative  Negative for appetite change and fever  HENT: Negative  Negative for hearing loss, tinnitus, trouble swallowing and voice change  Eyes: Negative  Negative for photophobia and pain  Positive for dry eyes     Respiratory: Negative  Negative for shortness of breath  Cardiovascular: Negative  Negative for palpitations  Gastrointestinal: Negative  Negative for nausea and vomiting  Endocrine: Negative  Negative for cold intolerance and heat intolerance  Genitourinary: Negative  Negative for dysuria, frequency and urgency  Musculoskeletal: Negative  Negative for myalgias and neck pain  Skin: Negative  Negative for rash  Neurological: Positive for tremors   Negative for dizziness, seizures, syncope, facial asymmetry, speech difficulty, weakness, light-headedness, numbness and headaches  Positive for cramping     Hematological: Negative  Does not bruise/bleed easily  Psychiatric/Behavioral: Negative  Negative for confusion, hallucinations and sleep disturbance  Keyshawn Page MD  1/17/2020  7:50 PM  Sign at close encounter  [facility] and [facility]        NEW PATIENT EVALUATION NOTE    Patient: [address] [place] Record Number: # [id number]  YOB: 1955  Date of visit: 1/17/2020    Referring provider: Dyana Villa MD    ASSESSMENT     Diagnoses for this encounter:  1  Parkinson's disease ([address] )       Impression of this 70 yo lady here for transfer of care for 11+ year history of idiopathic Parkinson's disease, finding benefit on Sinemet / rasagiline / rotigotine combination for years  She has more rigidity than tremor and little OFF time with her current regimen  However she feels she has increased cramping pain of late, having had no follow-up in a year  UPDRSIII of 14  Overall she is doing relatively well considering her duration of disease and with few non-motor symptoms  We refilled the Neupro patch for her, as that was something she was able to get for a low cost while she was at [facility]  Rest as below  PLAN     · Reviewed recent blood test results from [facility] as normal CBC, CMP, TSH  No new testing right now needed  · No recent or relevant neuroimaging results to review in [facility]  She had MRI L-spine in 2018 with L foraminal severe stenosis at L4-L5 post laminectomy  · We discussed about focusing on stretching exercises, suggested heating pads and tonic water to assist with cramping  Her ON OFF fluctuations are mild and we will otherwise continue the current regimen she has unchanged (Sinemet, Neupro, and Azilect)  · We have her registered on the Ne[employer]  com site, with a patient savings card emailed to her   She will let us know if she encounters problems with filling it out  · Thank you very much for sending me this interesting patient  · The patient has been instructed to call us about any new neurological problems or medication side effects  · Return to Clinic in 4 months  A total of 60 minutes were spent face-to-face with this patient, of which 25% was spent on counseling and coordination of care  We discussed the natural history of the patient's condition, differential diagnosis, level of diagnostic certainty, treatment alternatives and their side effects and possible complications  HISTORY OF PRESENT ILLNESS:     Ms Cyndi Kolb is a 71 y o  right handed female who has been referred to the [facility] [address] [place] [address] for transfer of care evaluation of parkinsonism  The patient was not accompanied today  History was obtained from patient   Referred from PCP    Patient had been established for many years at [facility] THE [facility] and saw several neurologists there including Dr Marianna Andrade and Dr Jonelle Mckeon  Her last office visit was in 12/2018 and Angelava Ballesteros was 6  Per review, her first symptom was R hand tremor since 2009, also with rigidity and pain, limping on R side when walking  A Dr Tanya Cheek started Azilect at first and then a Dr Janna Chris began Sinemet, which helped the [facility] tremor  She is on sinemet 25/100 1 tab at 8AM-12 noon-4PM-8PM- 1/2 tab at 12 AM  She feels the medication may not last for four hours  She is weak and tremor during off time  She is about 2-3 hours of off time total in a day  She is slightly having dyskinesia in between  She is not feeling discomfortable  Historically, what bothers her the most was frequent wearing off in between  Dyskinesia is not bothersome  She was started on Neupro patch due to the motor fluctuation  She did not take it until a few weeks ago  She is now at 8mg per day  No major problems  She also feels the motor fluctuation is less       Today she tells me that now she has begun to notice more of the aches, cramps, and rigidity coming back  She says she has been off exercise for the "last couple to months"  She had a recent bike trip in [facility] but had taken a hiatus afterwards  She denies feeling significant freezing of gait but does some shuffle  She denies impulsivity and hallucinations  She does feel some increased leg swelling at the end of the day  She had two back surgeries in the last three years which did set her back temporarily in her parkinson's symptoms  She does have some ON OFF periods, with some rigidity more than tremor during OFF  Current Relevant Medications:  Sinemet 25/100, Boniva, Calcium, rasagiline, rotigotine patch  Name of Med 7am 11pm 3pm 7pm  12am     Sinemet 25/100 IR 1 tab 1 tab 1 tab 1 tab  0 5 optional      Azilect 1mg 1 tab           Rotigotine 8mg/24hr 1 patch            Her doses usually last up to the following dose with little gap time  It takes 20+ minutes to kick in   She feels the 11am time period is usually when she feels the worst      REVIEW OF PAST MEDICAL, SOCIAL AND FAMILY HISTORY:  This is the list of problems as per our Medical Records:    Patient Active Problem List    Diagnosis Date Noted    Parkinson's disease ([facility] [address] ) 01/17/2020    Encounter for annual routine gynecological examination 06/21/2018     Past Medical History:   Diagnosis Date    Menopause     Parkinson disease [facility])       Past Surgical History:   Procedure Laterality Date    APPENDECTOMY      BACK SURGERY  05/03/2018    COLONOSCOPY      HYSTEROSCOPY W/ ENDOMETRIAL ABLATION      MAMMO STEREOTACTIC BREAST BIOPSY RIGHT (ALL INC) Right     benign    REDUCTION MAMMAPLASTY Bilateral     2007    SALPINGOOPHORECTOMY Right       Allergies   Allergen Reactions    Amoxicillin Hives    Penicillins Hives    Sulfa Antibiotics Hives      Outpatient Encounter Medications as of 1/17/2020   Medication Sig Dispense Refill    CALCIUM-VITAMIN D PO Take by mouth      carbidopa-levodopa (SINEMET)  mg per tablet Take 1 tablet by mouth 1 tablet at 7 am, 1 tablet at 11 am, 1 tablet at 3 pm, 1 tablet at 7 pm, 1/2 tablet at bed time   rasagiline (AZILECT) 1 MG Take by mouth      [DISCONTINUED] rotigotine (NEUPRO) 8 MG/24HR transdermal patch Neupro 8 mg/24 hour transdermal 24 hour patch      ibandronate (BONIVA) 150 MG tablet Take 150 mg by mouth       No facility-administered encounter medications on file as of 1/17/2020  Social History     Tobacco Use    Smoking status: Former Smoker    Smokeless tobacco: Never Used   Substance Use Topics    Alcohol use: Yes     Comment: SOCIAL     Family History   Problem Relation Age of Onset    Leukemia Mother     Diabetes Mother     Emphysema Father     Stroke Father     No Known Problems Sister     No Known Problems Daughter     No Known Problems Maternal Grandmother     No Known Problems Maternal Grandfather     No Known Problems Paternal Grandmother     No Known Problems Paternal Grandfather     Breast cancer Sister 54    Breast cancer Sister 54    No Known Problems Maternal Aunt     Cancer Maternal Aunt     No Known Problems Paternal Aunt     No Known Problems Paternal Aunt     No Known Problems Paternal Aunt         REVIEW OF SYSTEMS:  The patient has entered data on an intake form regarding present illness, past medical and surgical history, medications, allergies, family and social history, and a full review of 14 systems  I have reviewed this form with the patient, and all the relevant information has been included on this note  The full review of systems was negative except as stated in HPI and below  Constitutional: Negative  Negative for appetite change and fever  HENT: Negative  Negative for hearing loss, tinnitus, trouble swallowing and voice change  Eyes: Negative  Negative for photophobia and pain  Positive for dry eyes     Respiratory: Negative  Negative for shortness of breath  Cardiovascular: Negative    Negative for palpitations  Gastrointestinal: Negative  Negative for nausea and vomiting  Endocrine: Negative  Negative for cold intolerance and heat intolerance  Genitourinary: Negative  Negative for dysuria, frequency and urgency  Musculoskeletal: Negative  Negative for myalgias and neck pain  Skin: Negative  Negative for rash  Neurological: Positive for tremors  Negative for dizziness, seizures, syncope, facial asymmetry, speech difficulty, weakness, light-headedness, numbness and headaches  Positive for cramping     Hematological: Negative  Does not bruise/bleed easily  Psychiatric/Behavioral: Negative  Negative for confusion, hallucinations and sleep disturbance  PHYSICAL EXAMINATION:     Vital signs:  BP (!) 87/58 (BP Location: Left arm, Patient Position: Standing, Cuff Size: Large)   Pulse 87   Ht 5' 8" (1 727 m)   Wt 63 5 kg (140 lb)   LMP  (LMP Unknown)   BMI 21 29 kg/m²    Orthostatic BP as sitting 107/56 pulse 72, standing 87/58 with pulse 87    General: Mild hypomimia  Well-appearing, well nourished, pleasant patient in no acute distress  Mood appropriate  Head:  Normocephalic, atraumatic  Oropharynx and conjunctiva are clear  Speech  No hypophonia, no bradylalia  No scanning speech  Language: Comprehension intact  Neck:  Supple, strong 5/5 forward flexion and retroflexion  Extremities: Range of motion is normal       Cognitive and Mental Exam:  The patient is alert, oriented to self, location, date and situation  Memory is normal to provide accurate details of health history     Cranial Nerves:  CN II:  Direct and consensual light reflexes were equally reactive to light symmetrically  No afferent pupillary defect   Visual fields are full to confrontation  CN III / IV / VI: Extraocular movements were full, with normal pursuit and saccades  CN V:   Facial sensation to light touch was intact  CN VII: Face is symmetric with normal strength     CN VIII: Hearing was assessed using the Calibrated Finger Rub Auditory Screening Test (CALFRAST) and was not abnormal (Better than CALFRAST-Strong-70)  CN X:   Palate is up going bilaterally and symmetrically  CN XI:  Neck muscles are strong  CN XII: Tongue protrusion is at midline with normal movements  No dysarthria  Motor:    Dystonia: none  Dyskinesia: yes full body  Myoclonus: none  Chorea: none  Tics: none       UPDRSIII                Time since last dose:    1/17/20  At usual OFF time      Speech  0     Facial Expression  1    Postural Tremor (Right) 1    Postural Tremor (Left) 1    Kinetic Tremor (Right)  0    Kinetic Tremor (Left)  0    Rest tremor amplitude RUE 0    Rest tremor amplitude LUE 0    Rest tremor amplitude RLE 0    Rest tremor amplitude LLE 0    Lip/Jaw Tremor  0    Consistency of tremor 0    Finger Taps (Right)   0    Finger Taps (Left)  1    Hand Movement (Right)  0    Hand Movement (Left)   0    Pronation/Supination (Right)  1    Pronation/Supination (Left)   1    Toe Tapping (Right) 0    Toe Tapping (Left) 2    Leg Agility (Right)  0    Leg Agility (Left)   0     Rigidity - Neck  2     Rigidity - Upper Extremity (Right)  3      Rigidity - Upper Extremity (Left)   1     Rigidity - Lower Extremity (Right)  0    Rigidity - Lower Extremity (Left)   0    Arising from Chair   0      Gait    0     Freezing of Gait 0     Postural Stability  0     Posture 0     Global spontaneity of movement 0       -------------------------------------------------------------------------------------    Muscle Strength Right Left  Muscle Strength Right Left   Deltoid 5/5 5/5  Hip Adductors 5/5 5/5   Biceps 5/5 5/5  Hip Abductors 5/5 5/5   Triceps 5/5 5/5  Knee Extensors 5/5 5/5   Wrist Extensors 5/5 5/5  Knee Flexors 5/5 5/5   Wrist Flexors 5/5 5/5  Ankle Extensors 5/5 5/5    5/5 5/5  Ankle Flexors 5/5 5/5   Finger Abductors 5/5 5/5       Hip Flexors 5/5 5/5   Hip Extensors 5/5 5/5     Sensory  Intact to Light Touch, Temperature, and vibration sense in all extremities  Coordination:  Finger-to-nose-finger: normal     Gait:  Normal speed and rise from bench, symmetrical arm swing, no resting tremor, narrow base and normal turns  Pull test of 0 (one step back)        Reflexes:    Right Left   Biceps 2/4 2/4   Brachioradialis 2/4 2/4   Triceps 2/4 2/4   Knee 2/4 2/4   Ankle 2/4 2/4      Plantar cutaneous reflex:  Right: flexor  Left: flexor

## 2020-01-17 NOTE — PROGRESS NOTES
Review of Systems   Constitutional: Negative  Negative for appetite change and fever  HENT: Negative  Negative for hearing loss, tinnitus, trouble swallowing and voice change  Eyes: Negative  Negative for photophobia and pain  Positive for dry eyes     Respiratory: Negative  Negative for shortness of breath  Cardiovascular: Negative  Negative for palpitations  Gastrointestinal: Negative  Negative for nausea and vomiting  Endocrine: Negative  Negative for cold intolerance and heat intolerance  Genitourinary: Negative  Negative for dysuria, frequency and urgency  Musculoskeletal: Negative  Negative for myalgias and neck pain  Skin: Negative  Negative for rash  Neurological: Positive for tremors  Negative for dizziness, seizures, syncope, facial asymmetry, speech difficulty, weakness, light-headedness, numbness and headaches  Positive for cramping     Hematological: Negative  Does not bruise/bleed easily  Psychiatric/Behavioral: Negative  Negative for confusion, hallucinations and sleep disturbance

## 2020-05-26 ENCOUNTER — OFFICE VISIT (OUTPATIENT)
Dept: NEUROLOGY | Facility: CLINIC | Age: 65
End: 2020-05-26
Payer: MEDICARE

## 2020-05-26 VITALS
DIASTOLIC BLOOD PRESSURE: 60 MMHG | WEIGHT: 144.8 LBS | TEMPERATURE: 97.5 F | SYSTOLIC BLOOD PRESSURE: 129 MMHG | HEART RATE: 68 BPM | BODY MASS INDEX: 21.95 KG/M2 | HEIGHT: 68 IN

## 2020-05-26 DIAGNOSIS — G20 PARKINSON'S DISEASE (HCC): Primary | ICD-10-CM

## 2020-05-26 PROCEDURE — 99214 OFFICE O/P EST MOD 30 MIN: CPT | Performed by: PSYCHIATRY & NEUROLOGY

## 2020-05-26 RX ORDER — RASAGILINE 1 MG/1
TABLET ORAL
Qty: 90 EACH | Refills: 1 | Status: SHIPPED | OUTPATIENT
Start: 2020-05-26 | End: 2020-12-22

## 2020-05-26 RX ORDER — MULTIVITAMIN
TABLET ORAL
COMMUNITY
End: 2022-07-28

## 2020-08-19 NOTE — RESULT NOTES
Verified Results  THINPREP TIS AND HPV mRNA E6/E7 [id number] 46:52FF Josue Aguila     Test Name Result Flag Reference   CLINICAL INFORMATION:      Routine exam   LMP:      NONE GIVEN   PREV  PAP:      NONE GIVEN   PREV  BX:      NONE GIVEN   SOURCE:      Cervix, Endocervix   STATEMENT OF ADEQUACY:      Satisfactory for evaluation  Endocervical/transformation zone component  present  INTERPRETATION/RESULT:      Negative for intraepithelial lesion or malignancy  COMMENT:      This Pap test has been evaluated with computer  assisted technology  CYTOTECHNOLOGIST:      TOM SONG(ASCP)  CT screening location: [address] [place] [address]   HPV mRNA E6/E7 Not Detected  Not Detected   This test was performed using the APTIMA HPV Assay ([employer] )  This assay detects E6/E7 viral messenger RNA (mRNA) from 14  high-risk HPV types (16,18,31,33,35,39,45,51,52,56,58,59,66,68)  no

## 2020-12-11 ENCOUNTER — ANNUAL EXAM (OUTPATIENT)
Dept: GYNECOLOGY | Facility: CLINIC | Age: 65
End: 2020-12-11
Payer: MEDICARE

## 2020-12-11 VITALS
BODY MASS INDEX: 22.28 KG/M2 | DIASTOLIC BLOOD PRESSURE: 66 MMHG | HEART RATE: 84 BPM | WEIGHT: 147 LBS | HEIGHT: 68 IN | SYSTOLIC BLOOD PRESSURE: 118 MMHG

## 2020-12-11 DIAGNOSIS — M85.80 OSTEOPENIA, UNSPECIFIED LOCATION: ICD-10-CM

## 2020-12-11 DIAGNOSIS — Z01.419 ENCOUNTER FOR GYNECOLOGICAL EXAMINATION (GENERAL) (ROUTINE) WITHOUT ABNORMAL FINDINGS: Primary | ICD-10-CM

## 2020-12-11 DIAGNOSIS — Z78.0 MENOPAUSE: ICD-10-CM

## 2020-12-11 DIAGNOSIS — Z01.419 ENCOUNTER FOR GYNECOLOGICAL EXAMINATION WITH PAPANICOLAOU SMEAR OF CERVIX: ICD-10-CM

## 2020-12-11 DIAGNOSIS — Z13.820 SCREENING FOR OSTEOPOROSIS: ICD-10-CM

## 2020-12-11 DIAGNOSIS — Z12.31 SCREENING MAMMOGRAM, ENCOUNTER FOR: ICD-10-CM

## 2020-12-11 PROCEDURE — G0145 SCR C/V CYTO,THINLAYER,RESCR: HCPCS | Performed by: OBSTETRICS & GYNECOLOGY

## 2020-12-11 PROCEDURE — G0101 CA SCREEN;PELVIC/BREAST EXAM: HCPCS | Performed by: OBSTETRICS & GYNECOLOGY

## 2020-12-11 RX ORDER — PREDNISONE 1 MG/1
TABLET ORAL
COMMUNITY
End: 2021-12-22

## 2020-12-11 RX ORDER — NAPROXEN SODIUM 220 MG
TABLET ORAL AS NEEDED
COMMUNITY

## 2020-12-15 LAB
LAB AP GYN PRIMARY INTERPRETATION: NORMAL
Lab: NORMAL

## 2020-12-17 DIAGNOSIS — G20 PARKINSON'S DISEASE (HCC): ICD-10-CM

## 2020-12-22 RX ORDER — RASAGILINE 1 MG/1
TABLET ORAL
Qty: 90 TABLET | Refills: 1 | Status: SHIPPED | OUTPATIENT
Start: 2020-12-22 | End: 2021-06-23 | Stop reason: SDUPTHER

## 2021-01-05 ENCOUNTER — HOSPITAL ENCOUNTER (OUTPATIENT)
Dept: BONE DENSITY | Facility: MEDICAL CENTER | Age: 66
Discharge: HOME | End: 2021-01-05
Payer: MEDICARE

## 2021-01-05 DIAGNOSIS — M85.80 OSTEOPENIA, UNSPECIFIED LOCATION: ICD-10-CM

## 2021-01-05 DIAGNOSIS — Z13.820 SCREENING FOR OSTEOPOROSIS: ICD-10-CM

## 2021-01-05 DIAGNOSIS — Z78.0 MENOPAUSE: ICD-10-CM

## 2021-01-05 PROCEDURE — 77080 DXA BONE DENSITY AXIAL: CPT

## 2021-01-08 ENCOUNTER — NURSE TRIAGE (OUTPATIENT)
Dept: OTHER | Facility: OTHER | Age: 66
End: 2021-01-08

## 2021-01-08 NOTE — TELEPHONE ENCOUNTER
Exposure guidelines from [facility] not met  Patient's  is having some nondescript symptoms but is getting tested  Advised that if he tests positive then she would meet the exposure guidelines and [facility] can test her  Verbalized understanding  Will call back if needed  Reason for Disposition   [1] Caller concerned that exposure to COVID-19 occurred BUT [2] does not meet COVID-19 EXPOSURE criteria from [facility]    Answer Assessment - Initial Assessment Questions  1  COVID-19 CLOSE CONTACT: "Who is the person with the confirmed or suspected COVID-19 infection that you were exposed to?"      friend  2  PLACE of CONTACT: "Where were you when you were exposed to COVID-19?" (e g , home, school, medical waiting room; which city?)      Friend's house  3  TYPE of CONTACT: "How much contact was there?" (e g , sitting next to, live in same house, work in same office, same building)      Outside, 6ft or less for just a couple of minutes  4  DURATION of CONTACT: "How long were you in contact with the COVID-19 patient?" (e g , a few seconds, passed by person, a few minutes, 15 minutes or longer, live with the patient)      Less than 15 mins  5  MASK: "Were you wearing a mask?" "Was the other person wearing a mask?" Note: wearing a mask reduces the risk of an otherwise close contact  denies  6  DATE of CONTACT: "When did you have contact with a COVID-19 patient?" (e g , how many days ago)      12/31/2020  7  COMMUNITY SPREAD: "Are there lots of cases of COVID-19 (community spread) where you live?" (See public health department website, if unsure)        widespread  8  SYMPTOMS: "Do you have any symptoms?" (e g , fever, cough, breathing difficulty, loss of taste or smell)      denies  10  HIGH RISK: "Do you have any heart or lung problems?  Do you have a weak immune system?" (e g , heart failure, COPD, asthma, HIV positive, chemotherapy, renal failure, diabetes mellitus, sickle cell anemia, obesity)        Parkinson's disease  11  TRAVEL: "Have you traveled out of the country recently?" If so, "When and where?" Also ask about out-of-state travel, since the CDC has identified some high-risk cities for community spread in the [address]  Note: Travel becomes less relevant if there is widespread community transmission where the patient lives          denies    Protocols used: CORONAVIRUS (COVID-19 ) EXPOSURE-ADULT-[place]

## 2021-01-08 NOTE — TELEPHONE ENCOUNTER
Regardin/2 covid direct exposure, no sympt   ----- Message from Feliciano Engel sent at 2021  1:16 PM EST -----   covid test requested - patient was exposed on    No symptoms

## 2021-02-25 ENCOUNTER — HOSPITAL ENCOUNTER (OUTPATIENT)
Dept: MAMMOGRAPHY | Facility: IMAGING CENTER | Age: 66
Discharge: HOME | End: 2021-02-25
Payer: MEDICARE

## 2021-02-25 VITALS — BODY MASS INDEX: 21.98 KG/M2 | HEIGHT: 68 IN | WEIGHT: 145 LBS

## 2021-02-25 DIAGNOSIS — Z12.31 SCREENING MAMMOGRAM, ENCOUNTER FOR: ICD-10-CM

## 2021-02-25 PROCEDURE — 77067 SCR MAMMO BI INCL CAD: CPT

## 2021-02-25 PROCEDURE — 77063 BREAST TOMOSYNTHESIS BI: CPT

## 2021-02-26 ENCOUNTER — TELEPHONE (OUTPATIENT)
Dept: GYNECOLOGY | Facility: CLINIC | Age: 66
End: 2021-02-26

## 2021-02-26 DIAGNOSIS — Z91.89 AT HIGH RISK FOR BREAST CANCER: ICD-10-CM

## 2021-02-26 DIAGNOSIS — Z80.3 FAMILY HISTORY OF BREAST CANCER: Primary | ICD-10-CM

## 2021-02-26 DIAGNOSIS — R92.2 BREAST DENSITY: ICD-10-CM

## 2021-02-26 NOTE — TELEPHONE ENCOUNTER
LM on VM to [facility] - mammography reveals very dense breast  I would like her to follow through with referral for Dr Micky Browning office that we have discussed in the past secondary to densities, elevated TC score and family hx

## 2021-02-26 NOTE — TELEPHONE ENCOUNTER
Spoke with patient regarding findings on mammo  Another referral placed for Dr Jocelyn Hawley office and number for [facility] [place] given  ABUS ordered

## 2021-03-01 ENCOUNTER — TELEPHONE (OUTPATIENT)
Dept: SURGICAL ONCOLOGY | Facility: CLINIC | Age: 66
End: 2021-03-01

## 2021-03-01 NOTE — TELEPHONE ENCOUNTER
New Patient Breast Form   Patient Details:     Genny Albarran     1955     [id number]     Background Information:   [address] starts by opening a telephone encounter and gathering the following information   Who is calling to schedule and relationship?  self   Referring Provider Mabel Wilson   To which speciality is the referral?  Surgical Oncology   Reason for Visit? new   Tumor Type?  dense breast tissue, family history of breast ca   Is there a confimed diagnosis from biopsy/tissue reviewed by Pathology? No   Date of Tissue Diagnosis (If done outside of [facility] please obtain report and slides) na   Is patient aware of diagnosis, and who made them aware? Yes   If no tissue diagnosis, please stop and discuss with Navigator prior to scheduling   When was the diagnosis made? 2/26   Were outside slides requested  No   If biopsy done externally, obtain reports and slides for internal review   Have you had any imaging or labs done? Yes   If YES, when and  where was the blood work done? [facility]   If outside of [facility] obtain records   Was the patient told to bring a disk? no   Are records in EPIC? yes   Is there a personal history of cancer? no   If YES please list type and YR diagnosed na   If patient has a prior history of breast cancer were old records obtained? No   Have you ever had genetic testing done for breast cancer? If so, can you please bring a copy to appointment for timely treatment planning No   Is there a family history of cancer? yes   If YES please list type Two sister breast ca   Does the patient smoke or Vape? no   If yes, how many packs or cartridges per day? na   Scheduling Information:   [facility]   Are there any days the patient cannot be seen? na   Miscellaneous:   After completing the above information, please route to finance, nurse navigation and clinical trials for review

## 2021-03-31 ENCOUNTER — CONSULT (OUTPATIENT)
Dept: NEUROLOGY | Facility: CLINIC | Age: 66
End: 2021-03-31
Payer: MEDICARE

## 2021-03-31 VITALS
DIASTOLIC BLOOD PRESSURE: 53 MMHG | BODY MASS INDEX: 22.2 KG/M2 | WEIGHT: 146 LBS | HEART RATE: 89 BPM | SYSTOLIC BLOOD PRESSURE: 108 MMHG

## 2021-03-31 DIAGNOSIS — G20 PARKINSON'S DISEASE (HCC): Primary | ICD-10-CM

## 2021-03-31 PROCEDURE — 99214 OFFICE O/P EST MOD 30 MIN: CPT | Performed by: PSYCHIATRY & NEUROLOGY

## 2021-03-31 NOTE — PROGRESS NOTES
Patient ID: Abad Santana is a 76 y o  female  Assessment/Plan:    Parkinson's disease ([address] )  Parkinsonian fairly well controlled on current regimen  She has developed some wearing off about 20 minutes prior to any each dose, along with intermittent dyskinesias  Dyskinesias not bothersome at this point  Time spent discussing options for reducing off time given dyskinesias would consider adding amantadine or a longer-acting version of amantadine such as ago call every are also lacks in an attempt to reduce off time and help dampen dyskinesias  Other off since such as entacapone/ opicapone or Patricia Laming can also be added to help reduce off time  The options may in fact increased dyskinesias so this would be something we would have to her monitor  We also discussed surgical options for reducing off time and disease including deep brain stimulation surgery an MRI focused ultrasound  At this point she does meet criteria for deep brain stimulation surgery as she has motor fluctuations with dyskinesias and is responsive to levodopa  We discussed the surgical procedure, potential benefits, and risks  Questions regarding MRI focused ultrasound were also answered  At this point in time she is functioning well  She has opted to keep her medications the same as she wishes to remain on the lowest dose of medication needed at any given point in time  She will contact me if there is any issue prior to the next visit  Diagnoses and all orders for this visit:    Parkinson's disease [facility])           Subjective:    Ms Gabino Galvan is woman with Parkinson's disease who presents to the Movement disorder clinic for follow up  This is my first visit with the patient as she was previously followed by Dr Vanessa Mota  Review of notes reveal symptoms began in 2009 with right hand res tremor, rigidity and limping on the right side   She was diagnosed and followed at [address] [place] [address] neurology for many years and seen by several neurologists and movement specialist up until 2018  Azilect was started and then Sinemet was added which improved tremor  Overtime she developed fluctuations with wearing off and dyskinesia and Neupro patch was added with improvement  She was also seen at [facility] for biomedical research projects with Dr Estephania Evans  She has undergone two back surgeries and right torn bicep  Current Relevant Medications:   Sinemet 25/100 IR 1 tab at 8am, 12, 4pm, 8pm and 1/2 tab qhs (1am)  Azilect 1mg daily            Rotigotine 8mg/24hr    Am onset about 20 minutes              She can tell a dose is wearing off about 20 minutes prior to the next dose due to legs starting to feel heavy, tremor and stiffness  She can typically wait this out  Right rest tremor is present at times  Speech can be hesitant  No drooling  Chewing and swallowing without difficulty  Eating tasks, dressing, and hygiene tasks without difficulty  Handwriting is sloppy and micrographic when off  Hobbies and activities - 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  She rides a bicycle when she can  She has not been as active during covid  Arising out of chair without difficulty  Walking and balance is unchanged  She is not walking as much as prior  She has a bunion which impairs distance  She had been seeing a podiatrist who retired  She uses toe separaters  Freezing-none   Turning in bed  without difficulty  Sleepwell  No daytime sedation   Urinary problems-no  Constipation- no   Lightheadedness on standing  every once in a ahile  Cognitive impairment - no issues  Psychosis/ hallucinations- no     Dyskinesia- mild at time but overall less frequent  Objective:    Blood pressure 108/53, pulse 89, weight 66 2 kg (146 lb), not currently breastfeeding  Physical Exam  Eyes:      Extraocular Movements: Extraocular movements intact  Pupils: Pupils are equal, round, and reactive to light     Neurological: Mental Status: She is alert  Deep Tendon Reflexes: Strength normal          Neurological Exam  Mental Status  Alert  Oriented to person, place, time and situation  Speech: hypophonia  Language is fluent with no aphasia  Attention and concentration are normal     Cranial Nerves  CN III, IV, VI: Extraocular movements intact bilaterally  Pupils equal round and reactive to light bilaterally  CN VII: Full and symmetric facial movement  CN VIII: Hearing is normal   CN XI: Shoulder shrug strength is normal   CN XII: Tongue midline without atrophy or fasciculations  Motor   Normal muscle tone  Strength is 5/5 throughout all four extremities  Sensory  Light touch is normal in upper and lower extremities  Coordination  Right: Finger-to-nose normal  Rapid alternating movement abnormality:  Left: Finger-to-nose normal  Rapid alternating movement abnormality:  See MDS UPDRS III  Gait  Casual gait is normal including stance, stride, and arm swing   Normal pull test  Able to rise from chair without using arms        MDS UPDRS III                              3/31/21   Time since last dose: 1 h 20 min   Speech  1   Facial Expression  2   Rigidity - Neck  0   Rigidity - Upper Extremity (Right)  0   Rigidity - Upper Extremity (Left)   0   Rigidity - Lower Extremity (Right)  0   Rigidity - Lower Extremity (Left)   1   Finger Taps (Right)   0   Finger Taps (Left)   1   Hand Movement (Right)  0   Hand Movement (Left)   0   Pronation/Supination (Right)  0   Pronation/Supination (Left)   1   Toe Tapping (Right) 0   Toe Tapping (Left) 1   Leg Agility (Right)  0   Leg Agility (Left)   0   Arising from Chair   0   Gait   0   Freezing of Gait 0   Postural Stability   0   Posture 0   Global spontaneity of movement 1   Postural Tremor (Right) 1   Postural Tremor (Left) 0   Kinetic Tremor (Right)  0   Kinetic Tremor (Left)  0   Rest tremor amplitude RUE 1   Rest tremor amplitude LUE 0   Rest tremor amplitude RLE 0   Reset tremor amplitude LLE 0   Lip/Jaw Tremor  0   Consistency of tremor 1   Motor Exam Total:          ROS:    Review of Systems    Constitutional: Negative  Negative for appetite change and fever  HENT: Negative  Negative for hearing loss, tinnitus, trouble swallowing and voice change  Eyes: Negative  Negative for photophobia and pain  Respiratory: Negative  Negative for shortness of breath  Cardiovascular: Negative  Negative for palpitations  Gastrointestinal: Negative  Negative for nausea and vomiting  Endocrine: Negative  Negative for cold intolerance  Genitourinary: Negative  Negative for dysuria, frequency and urgency  Musculoskeletal: Positive for back pain, gait problem (Shuffling feet) and myalgias  Negative for neck pain  Skin: Negative  Negative for rash  Allergic/Immunologic: Negative  Neurological: Positive for tremors (Mostly Right arm) and weakness  Negative for dizziness, seizures, syncope, facial asymmetry, speech difficulty, light-headedness, numbness and headaches  Hematological: Negative  Does not bruise/bleed easily  Psychiatric/Behavioral: Negative  Negative for confusion, hallucinations and sleep disturbance  All other systems reviewed and are negative  Review of system was personally reviewed

## 2021-03-31 NOTE — PROGRESS NOTES
Review of Systems   Constitutional: Negative  Negative for appetite change and fever  HENT: Negative  Negative for hearing loss, tinnitus, trouble swallowing and voice change  Eyes: Negative  Negative for photophobia and pain  Respiratory: Negative  Negative for shortness of breath  Cardiovascular: Negative  Negative for palpitations  Gastrointestinal: Negative  Negative for nausea and vomiting  Endocrine: Negative  Negative for cold intolerance  Genitourinary: Negative  Negative for dysuria, frequency and urgency  Musculoskeletal: Positive for back pain, gait problem (Shuffling feet) and myalgias  Negative for neck pain  Skin: Negative  Negative for rash  Allergic/Immunologic: Negative  Neurological: Positive for tremors (Mostly Right arm) and weakness  Negative for dizziness, seizures, syncope, facial asymmetry, speech difficulty, light-headedness, numbness and headaches  Hematological: Negative  Does not bruise/bleed easily  Psychiatric/Behavioral: Negative  Negative for confusion, hallucinations and sleep disturbance  All other systems reviewed and are negative

## 2021-03-31 NOTE — PATIENT INSTRUCTIONS
Will continue on current medications  If you develop increase off time we can consider adding amantadine/ Gocovri/ Osmolex  Please contact us if there is any issue prior to the next visit

## 2021-03-31 NOTE — ASSESSMENT & PLAN NOTE
Parkinsonian fairly well controlled on current regimen  She has developed some wearing off about 20 minutes prior to any each dose, along with intermittent dyskinesias  Dyskinesias not bothersome at this point  Time spent discussing options for reducing off time given dyskinesias would consider adding amantadine or a longer-acting version of amantadine such as ago call every are also lacks in an attempt to reduce off time and help dampen dyskinesias  Other off since such as entacapone/ opicapone or Figueroa Silverio can also be added to help reduce off time  The options may in fact increased dyskinesias so this would be something we would have to her monitor  We also discussed surgical options for reducing off time and disease including deep brain stimulation surgery an MRI focused ultrasound  At this point she does meet criteria for deep brain stimulation surgery as she has motor fluctuations with dyskinesias and is responsive to levodopa  We discussed the surgical procedure, potential benefits, and risks  Questions regarding MRI focused ultrasound were also answered  At this point in time she is functioning well  She has opted to keep her medications the same as she wishes to remain on the lowest dose of medication needed at any given point in time  She will contact me if there is any issue prior to the next visit

## 2021-04-30 ENCOUNTER — CONSULT (OUTPATIENT)
Dept: SURGICAL ONCOLOGY | Facility: CLINIC | Age: 66
End: 2021-04-30
Payer: MEDICARE

## 2021-04-30 VITALS
WEIGHT: 148 LBS | HEIGHT: 68 IN | RESPIRATION RATE: 16 BRPM | TEMPERATURE: 97.9 F | SYSTOLIC BLOOD PRESSURE: 100 MMHG | BODY MASS INDEX: 22.43 KG/M2 | HEART RATE: 89 BPM | DIASTOLIC BLOOD PRESSURE: 80 MMHG

## 2021-04-30 DIAGNOSIS — Z91.89 AT HIGH RISK FOR BREAST CANCER: ICD-10-CM

## 2021-04-30 DIAGNOSIS — R92.2 BREAST DENSITY: ICD-10-CM

## 2021-04-30 DIAGNOSIS — Z12.31 VISIT FOR SCREENING MAMMOGRAM: ICD-10-CM

## 2021-04-30 DIAGNOSIS — R92.2 DENSE BREASTS: Primary | ICD-10-CM

## 2021-04-30 DIAGNOSIS — Z80.3 FAMILY HISTORY OF BREAST CANCER: ICD-10-CM

## 2021-04-30 DIAGNOSIS — Z12.39 ENCOUNTER FOR BREAST CANCER SCREENING OTHER THAN MAMMOGRAM: ICD-10-CM

## 2021-04-30 PROBLEM — R92.30 DENSE BREASTS: Status: ACTIVE | Noted: 2021-04-30

## 2021-04-30 PROCEDURE — 99204 OFFICE O/P NEW MOD 45 MIN: CPT | Performed by: NURSE PRACTITIONER

## 2021-04-30 NOTE — PATIENT INSTRUCTIONS
Breast Self Exam for Women   AMBULATORY CARE:   A breast self-exam (BSE)  is a way to check your breasts for lumps and other changes  Regular BSEs can help you know how your breasts normally look and feel  Most breast lumps or changes are not cancer, but you should always have them checked by a healthcare provider  Why you should do a BSE:  Breast cancer is the most common type of cancer in women  Even if you have mammograms, you may still want to do a BSE regularly  If you know how your breasts normally feel and look, it may help you know when to contact your healthcare provider  Mammograms can miss some cancers  You may find a lump during a BSE that did not show up on a mammogram   When you should do a BSE:  If you have periods, you may want to do your BSE 1 week after your period ends  This is the time when your breasts may be the least swollen, lumpy, or tender  You can do regular BSEs even if you are breastfeeding or have breast implants  Call your doctor if:   · You find any lumps or changes in your breasts  · You have breast pain or fluid coming from your nipples  · You have questions or concerns about your condition or care  How to do a BSE:       · Look at your breasts in a mirror  Look at the size and shape of each breast and nipple  Check for swelling, lumps, dimpling, scaly skin, or other skin changes  Look for nipple changes, such as a nipple that is painful or beginning to pull inward  Gently squeeze both nipples and check to see if fluid (that is not breast milk) comes out of them  If you find any of these or other breast changes, contact your healthcare provider  Check your breasts while you sit or  the following 3 positions:    ? Hang your arms down at your sides  ? Raise your hands and join them behind your head  ? Put firm pressure with your hands on your hips  Bend slightly forward while you look at your breasts in the mirror  · Lie down and feel your breasts    When you lie down, your breast tissue spreads out evenly over your chest  This makes it easier for you to feel for lumps and anything that may not be normal for your breasts  Do a BSE on one breast at a time  ? Place a small pillow or towel under your left shoulder  Put your left arm behind your head  ? Use the 3 middle fingers of your right hand  Use your fingertip pads, on the top of your fingers  Your fingertip pad is the most sensitive part of your finger  ? Use small circles to feel your breast tissue  Use your fingertip pads to make dime-sized, overlapping circles on your breast and armpits  Use light, medium, and firm pressure  First, press lightly  Second, press with medium pressure to feel a little deeper into the breast  Last, use firm pressure to feel deep within your breast     ? Examine your entire breast area  Examine the breast area from above the breast to below the breast where you feel only ribs  Make small circles with your fingertips, starting in the middle of your armpit  Make circles going up and down the breast area  Continue toward your breast and all the way across it  Examine the area from your armpit all the way over to the middle of your chest (breastbone)  Stop at the middle of your chest     ? Move the pillow or towel to your right shoulder, and put your right arm behind your head  Use the 3 fingertip pads of your left hand, and repeat the above steps to do a BSE on your right breast   What else you can do to check for breast problems or cancer:  Talk to your healthcare provider about mammograms  A mammogram is an x-ray of your breasts to screen for breast cancer or other problems  Your provider can tell you the benefits and risks of mammograms  The first mammogram is usually at age 39 or 48  Your provider may recommend you start at 36 or younger if your risk for breast cancer is high  Mammograms usually continue every 1 to 2 years until age 76       Follow up with your doctor as directed:  Write down your questions so you remember to ask them during your visits  © Copyright [address] 2021 Information is for End User's use only and may not be sold, redistributed or otherwise used for commercial purposes  All illustrations and images included in CareNotes® are the copyrighted property of [employer]  or Jac Franco  The above information is an  only  It is not intended as medical advice for individual conditions or treatments  Talk to your doctor, nurse or pharmacist before following any medical regimen to see if it is safe and effective for you

## 2021-04-30 NOTE — PROGRESS NOTES
Surgical Oncology Follow Up       [facility]  [facility] [id number]    Austyn Griffith  1955  [id number]      Chief Complaint   Patient presents with    Consult     Pt is here for initial consultation F[facility]x breast cancer        Assessment/Plan:  1  Family history of breast cancer    2  At high risk for breast cancer  - Recommended CBE q 6 mo, annual 3d mammography, annual 3d screening mammogram  - Consume healthy diet, exercise regularly, maintain healthy weight  - 1 year f/u visit    3  Breast density    4  Dense breasts  - US breast screening bilateral complete (ABUS); Future    5  Encounter for breast cancer screening other than mammogram  - US breast screening bilateral complete (ABUS); Future    6  Visit for screening mammogram  - Mammo screening bilateral w 3d & cad; Future      Discussion/Summary:   Patient is a 79-year-old female that presents today for consultation regarding an increased risk of breast cancer, dense breasts and a family history of breast cancer  Two of her sisters were diagnosed with DCIS in their 62s  I reviewed with the patient that she would not qualify for genetic testing coverage however she does have the option to pursue genetic testing with out-of-pocket coverage  Patient is not currently interested in a meeting with oncology Genetics  There are no concerns on her exam today  She had a bilateral mammogram in February which was BI-RADS 1, category 4 density  Her lifetime TC risk was calculated at 30 3%  I reviewed risk reducing measures with her    I am recommending an automated breast ultrasound in addition to her annual 3D mammogram   I will order this for August   She will be seeing her gynecologist in the fall will receive a clinical exam at that time so I will plan to see her back in 1 year, following her mammogram   She was instructed to call with any changes on self-exam   She is in agreement with these recommendations  All of her questions were answered today  History of Present Illness:     -Interval History: Patient is a 58-year-old female that presents today for a consultation regarding a family history of breast cancer and dense breasts  Two of her three sisters have been diagnosed with DCIS in their 62s  Neither sister has undergone genetic testing  Her maternal aunt was diagnosed with leukemia  Patient has no personal history of cancer  She is not of Ashkenazi Judaism descent  She had a bilateral 3D screening mammogram on February 25, 2021 which was BI-RADS 1, category 4 density  He lifetime risk was estimated at 33 19%  She had a benign right breast biopsy in the past  She underwent breast reductions (Dr Kristian Moore) in 2007  Menarche age 14, 11 pregnancies, 3 live births  She was 29 at the time her 1st child was born  She has used birth control pills and the Norplant in the past   She has never used hormone replacement therapy  She is a former smoker, quitting in [address] and consumes approximately 15 alcoholic beverages per week  Review of Systems:  Review of Systems   Constitutional: Negative for activity change, appetite change, chills, fatigue, fever and unexpected weight change  Respiratory: Negative for cough and shortness of breath  Cardiovascular: Negative for chest pain  Gastrointestinal: Negative for abdominal pain, constipation, diarrhea, nausea and vomiting  Musculoskeletal: Negative for arthralgias, back pain, gait problem and myalgias  Skin: Negative for color change and rash  Neurological: Negative for dizziness and headaches  Parkinson's disease   Hematological: Negative for adenopathy  Psychiatric/Behavioral: Negative for agitation and confusion  All other systems reviewed and are negative        Patient Active Problem List   Diagnosis    Encounter for annual routine gynecological examination    Parkinson's disease ([facility] [address] )    Family history of breast cancer     Past Medical History:   Diagnosis Date    Menopause     Parkinson disease [facility])      Past Surgical History:   Procedure Laterality Date    APPENDECTOMY      BACK SURGERY  05/03/2018    BREAST BIOPSY Right 2002    COLONOSCOPY      HYSTEROSCOPY W/ ENDOMETRIAL ABLATION      MAMMO STEREOTACTIC BREAST BIOPSY RIGHT (ALL INC) Right     benign    REDUCTION MAMMAPLASTY Bilateral     2007    SALPINGOOPHORECTOMY Right      Family History   Problem Relation Age of Onset   Gamal [place] Leukemia Mother     Diabetes Mother     Emphysema Father     Stroke Father     No Known Problems Sister     No Known Problems Daughter     No Known Problems Maternal Grandmother     No Known Problems Maternal Grandfather     No Known Problems Paternal Grandmother     No Known Problems Paternal Grandfather     Breast cancer Sister 54    Breast cancer Sister 54    No Known Problems Maternal Aunt     Cancer Maternal Aunt     No Known Problems Paternal Aunt     No Known Problems Paternal Aunt     No Known Problems Paternal Aunt      Social History     Socioeconomic History    Marital status: /Civil Union     Spouse name: Not on file    Number of children: Not on file    Years of education: Not on file    Highest education level: Not on file   Occupational History    Not on file   Social Needs    Financial resource strain: Not on file    Food insecurity     Worry: Not on file     Inability: Not on file   [employer] needs     Medical: Not on file     Non-medical: Not on file   Tobacco Use    Smoking status: Former Smoker    Smokeless tobacco: Never Used   Substance and Sexual Activity    Alcohol use: Yes     Comment: SOCIAL    Drug use: Never    Sexual activity: Yes   Lifestyle    Physical activity     Days per week: Not on file     Minutes per session: Not on file    Stress: Not on file   Relationships    Social connections     Talks on phone: Not on file     Gets together: Not on file     Attends Adventism service: Not on file     Active member of club or organization: Not on file     Attends meetings of clubs or organizations: Not on file     Relationship status: Not on file    Intimate partner violence     Fear of current or ex partner: Not on file     Emotionally abused: Not on file     Physically abused: Not on file     Forced sexual activity: Not on file   Other Topics Concern    Not on file   Social History Narrative    Not on file       Current Outpatient Medications:     CALCIUM-VITAMIN D PO, Take by mouth, Disp: , Rfl:     carbidopa-levodopa (SINEMET)  mg per tablet, TAKE 1 TABLET AT 8AM,12,4,AND 7:30 PM THEN 1/2 TABLET AT BEDTIME, Disp: 420 tablet, Rfl: 1    Multiple Vitamin (MULTI-VITAMIN DAILY) TABS, Take by mouth, Disp: , Rfl:     naproxen sodium (Aleve) 220 MG tablet, as needed , Disp: , Rfl:     rasagiline (AZILECT) 1 MG, TAKE 1 TABLET BY MOUTH EVERY DAY, Disp: 90 tablet, Rfl: 1    rotigotine (Neupro) 8 MG/24HR transdermal patch, Place 1 patch on the skin daily, Disp: 90 patch, Rfl: 3    predniSONE 5 mg tablet, prednisone 5 mg tablet  Take 4 po daily for 3 days - Then 3 po daily for 3 days - Then 2 po daily for 3 days - Then 1 po daily for 3 days  , Disp: , Rfl:   Allergies   Allergen Reactions    Amoxicillin Hives    Penicillins Hives    Sulfa Antibiotics Hives     Vitals:    04/30/21 1310   BP: 100/80   Pulse: 89   Resp: 16   Temp: 97 9 °F (36 6 °C)       Physical Exam  Vitals signs reviewed  Constitutional:       General: She is not in acute distress  Appearance: Normal appearance  She is well-developed  She is not diaphoretic  HENT:      Head: Normocephalic and atraumatic  Neck:      Musculoskeletal: Normal range of motion  Pulmonary:      Effort: Pulmonary effort is normal    Chest:      Breasts:         Right: Skin change (reduction scars) present  No swelling, bleeding, inverted nipple, mass, nipple discharge or tenderness           Left: Skin change (reduction scars) present  No swelling, bleeding, inverted nipple, mass, nipple discharge or tenderness  Abdominal:      Palpations: Abdomen is soft  There is no mass  Tenderness: There is no abdominal tenderness  Musculoskeletal: Normal range of motion  Lymphadenopathy:      Upper Body:      Right upper body: No supraclavicular or axillary adenopathy  Left upper body: No supraclavicular or axillary adenopathy  Skin:     General: Skin is warm and dry  Findings: No rash  Neurological:      Mental Status: She is alert and oriented to person, place, and time  Motor: Tremor present  Psychiatric:         Speech: Speech normal          Advance Care Planning/Advance Directives:  Discussed disease status and treatment goals with the patient

## 2021-06-23 DIAGNOSIS — G20 PARKINSON'S DISEASE (HCC): ICD-10-CM

## 2021-06-23 RX ORDER — RASAGILINE 1 MG/1
TABLET ORAL
Qty: 90 TABLET | Refills: 1 | Status: SHIPPED | OUTPATIENT
Start: 2021-06-23 | End: 2021-09-07 | Stop reason: SDUPTHER

## 2021-06-23 NOTE — TELEPHONE ENCOUNTER
Pt called requesting rasagiline refill be sent to [facility] pharmacy on file  Rx entered   Pls review and sign off      thanks

## 2021-06-30 DIAGNOSIS — G20 PARKINSON'S DISEASE (HCC): ICD-10-CM

## 2021-07-02 RX ORDER — ROTIGOTINE 8 MG/24H
1 PATCH, EXTENDED RELEASE TRANSDERMAL DAILY
Qty: 90 PATCH | Refills: 3 | Status: SHIPPED | OUTPATIENT
Start: 2021-07-02 | End: 2021-09-07 | Stop reason: SDUPTHER

## 2021-08-26 ENCOUNTER — HOSPITAL ENCOUNTER (OUTPATIENT)
Dept: ULTRASOUND IMAGING | Facility: CLINIC | Age: 66
Discharge: HOME | End: 2021-08-26
Payer: MEDICARE

## 2021-08-26 VITALS — WEIGHT: 148 LBS | HEIGHT: 68 IN | BODY MASS INDEX: 22.43 KG/M2

## 2021-08-26 DIAGNOSIS — R92.2 DENSE BREASTS: ICD-10-CM

## 2021-08-26 DIAGNOSIS — Z12.39 ENCOUNTER FOR BREAST CANCER SCREENING OTHER THAN MAMMOGRAM: ICD-10-CM

## 2021-08-26 PROCEDURE — 76641 ULTRASOUND BREAST COMPLETE: CPT

## 2021-09-01 ENCOUNTER — TELEPHONE (OUTPATIENT)
Dept: NEUROLOGY | Facility: CLINIC | Age: 66
End: 2021-09-01

## 2021-09-01 ENCOUNTER — OFFICE VISIT (OUTPATIENT)
Dept: NEUROLOGY | Facility: CLINIC | Age: 66
End: 2021-09-01
Payer: MEDICARE

## 2021-09-01 VITALS
HEART RATE: 87 BPM | BODY MASS INDEX: 22.93 KG/M2 | WEIGHT: 150.8 LBS | SYSTOLIC BLOOD PRESSURE: 124 MMHG | DIASTOLIC BLOOD PRESSURE: 58 MMHG

## 2021-09-01 DIAGNOSIS — G20 PARKINSON'S DISEASE (HCC): Primary | ICD-10-CM

## 2021-09-01 PROBLEM — M54.50 LOW BACK PAIN: Status: ACTIVE | Noted: 2021-09-01

## 2021-09-01 PROCEDURE — 99214 OFFICE O/P EST MOD 30 MIN: CPT | Performed by: PSYCHIATRY & NEUROLOGY

## 2021-09-01 RX ORDER — GABAPENTIN 300 MG/1
CAPSULE ORAL
COMMUNITY
Start: 2021-09-01 | End: 2021-12-22

## 2021-09-01 NOTE — PATIENT INSTRUCTIONS
Continue current parkinson's meds    Referral sent to [facility] Neurosurgery    Followup in 5 months

## 2021-09-01 NOTE — TELEPHONE ENCOUNTER
Patient says she forgot to ask you for a Rx for the Covid booster shot  Wasn't sure what to tell her so I advised her someone would get conteh to her re: this

## 2021-09-01 NOTE — ASSESSMENT & PLAN NOTE
Patient's ongoing low back pain with left sided weakness of lower extremities of left hip flexion and left dorsiflexion are due to patient's back issues which patient is following up with [facility] pain management- patient to get spinal injection and cyst aspiration by [facility] Dr Ange Krueger tomorrow 9/2

## 2021-09-01 NOTE — PROGRESS NOTES
Patient ID: Karan Price is a 78 y o  female  Assessment/Plan:    Parkinson's disease [facility])  Patient is a 78year old woman presenting for Parkinson's disease followup  Sinemet, Azilect, and Neupro regimen have been controlling her symptoms well  She does notice that shes noticing more rigidity, bradykinesia, shuffling gait when the medication wears off from the regimen  On Neurologic exam, patient had chronic left sided lower extremity due to back issues  Impression: Patient's Parkinsonian symptoms well controlled but patient notices increased fluctuations of symptoms- shuffling gait, rigidity  Patient ok to do DBS to reduce the Parkinson's fluctuations in between doses  Plan:  Discussed what DBS can do; Let know DBS can uncover other symptoms of Parkinson's that was not noticed before; Discussed there is pretesting- recommended giving 6 months leeway for completion of total treatment and visits for DBS as patient wants completion of treatment before family wedding next year and want to focus on back issues first before DBS  Discussed risks and benefits for DBS; Discussed about Amantadine but will hold due to DBS and patient  Continue Azilect, Sinemet, and Neupro as previous regimen   Patient to do DBS at [facility] in [place]; Referral also sent to [facility] Neurosurgery as well  Follow up in 5 months      Low back pain  Patient's ongoing low back pain with left sided weakness of lower extremities of left hip flexion and left dorsiflexion are due to patient's back issues which patient is following up with [facility] pain management- patient to get spinal injection and cyst aspiration by [facility] Dr Munoz Few tomorrow 9/2  Diagnoses and all orders for this visit:    Parkinson's disease [facility])  -     Ambulatory referral to Neurosurgery;  Future    Other orders  -     gabapentin (NEURONTIN) 300 mg capsule  -     METHOCARBAMOL PO; methocarbamol           Subjective:    Patient is a 78year old woman with PMHx of back surgeries X2, herniated disc presenting for Parkinson's disease followup  To review patient had right hand resting tremor, rigidity, and limping on her right side  Patient was on Azilect and started on sinemet which improved tremors  Patient developed fluctuations when wearing off and patient was placed on Neupro patch which helped with the fluctuations  Patient was also seen at [facility] and had 2 back surgeries  Spinal injection and aspiarting a cyst on the L3 area; most likely fusion will be done by Dr Saul Art of pain management 9/2  Since last visit, everything seems stable  Right hand tremor controlled with Azilect, Sinemet, Neupro  Patient does seem to be shuffling a little more due to parkinson's vs back pain  No complaints no side effects  Patient takes Sinemet  4 times a day full dose 8 am, 12pm, 4pm, 8pm  Half a dose before bed  Azilect in morning with 8am with sinemet  Patient states shes been noticing Parkinson's symptoms 30 minutes after taking the medications  Increased symptoms in between doses since last visit  No falls, no ambulatory dysfunction  Patient tingling and numbness on occasion with the back issues  Methocarbomol for muscle relaxer thinks causing constipation  The following portions of the patient's history were reviewed and updated as appropriate: She  has a past medical history of Menopause and Parkinson disease ([facility] [address] )  She   Patient Active Problem List    Diagnosis Date Noted    Low back pain 09/01/2021    Family history of breast cancer 04/30/2021    Dense breasts 04/30/2021    Parkinson's disease ([facility] [address] ) 01/17/2020    Encounter for annual routine gynecological examination 06/21/2018     She  has a past surgical history that includes Appendectomy; Colonoscopy; Hysteroscopy w/ endometrial ablation; Back surgery (05/03/2018); Salpingoophorectomy (Right); Reduction mammaplasty (Bilateral);  Mammo stereotactic breast biopsy right (all inc) (Right); and Breast biopsy (Right, 2002)  Her family history includes Breast cancer (age of onset: 54) in her sister and sister; Cancer in her maternal aunt; Diabetes in her mother; Emphysema in her father; Leukemia in her mother; No Known Problems in her daughter, maternal aunt, maternal grandfather, maternal grandmother, paternal aunt, paternal aunt, paternal aunt, paternal grandfather, paternal grandmother, and sister; Stroke in her father  She  reports that she has quit smoking  She has never used smokeless tobacco  She reports current alcohol use  She reports that she does not use drugs  Current Outpatient Medications   Medication Sig Dispense Refill    CALCIUM-VITAMIN D PO Take by mouth      carbidopa-levodopa (SINEMET)  mg per tablet TAKE 1 TABLET AT 8AM,12,4,AND 7:30 PM THEN 1/2 TABLET AT BEDTIME 405 tablet 3    gabapentin (NEURONTIN) 300 mg capsule       METHOCARBAMOL PO methocarbamol      Multiple Vitamin (MULTI-VITAMIN DAILY) TABS Take by mouth      rasagiline (AZILECT) 1 MG TAKE 1 TABLET BY MOUTH EVERY DAY 90 tablet 1    rotigotine (Neupro) 8 MG/24HR transdermal patch Place 1 patch on the skin daily 90 patch 3    naproxen sodium (Aleve) 220 MG tablet as needed       predniSONE 5 mg tablet prednisone 5 mg tablet   Take 4 po daily for 3 days - Then 3 po daily for 3 days - Then 2 po daily for 3 days - Then 1 po daily for 3 days  No current facility-administered medications for this visit       Current Outpatient Medications on File Prior to Visit   Medication Sig    CALCIUM-VITAMIN D PO Take by mouth    carbidopa-levodopa (SINEMET)  mg per tablet TAKE 1 TABLET AT 8AM,12,4,AND 7:30 PM THEN 1/2 TABLET AT BEDTIME    gabapentin (NEURONTIN) 300 mg capsule     METHOCARBAMOL PO methocarbamol    Multiple Vitamin (MULTI-VITAMIN DAILY) TABS Take by mouth    rasagiline (AZILECT) 1 MG TAKE 1 TABLET BY MOUTH EVERY DAY    rotigotine (Neupro) 8 MG/24HR transdermal patch Place 1 patch on the skin daily    naproxen sodium (Aleve) 220 MG tablet as needed     predniSONE 5 mg tablet prednisone 5 mg tablet   Take 4 po daily for 3 days - Then 3 po daily for 3 days - Then 2 po daily for 3 days - Then 1 po daily for 3 days  No current facility-administered medications on file prior to visit  She is allergic to amoxicillin, penicillins, and sulfa antibiotics            Objective:    Blood pressure 124/58, pulse 87, weight 68 4 kg (150 lb 12 8 oz), not currently breastfeeding  Physical Exam  Eyes:      Extraocular Movements: Extraocular movements intact  Neurological:      Coordination: Coordination is intact  Romberg sign negative  Deep Tendon Reflexes: Reflexes are normal and symmetric  Psychiatric:         Speech: Speech normal          Neurological Exam  Mental Status  Awake, alert and oriented to person, place and time  Speech is normal  Language is fluent with no aphasia  Cranial Nerves  CN II: Visual acuity is normal   CN III, IV, VI: Extraocular movements intact bilaterally  CN V: Facial sensation is normal   CN VII: Full and symmetric facial movement  CN XI: Shoulder shrug strength is normal   CN XII: Tongue midline without atrophy or fasciculations  Motor  Normal muscle bulk throughout  No fasciculations present  Normal muscle tone  No abnormal involuntary movements  Strength is 5/5 in all four extremities except as noted  Right                     Left  Hip flexion                                                      4+  Plantarflexion                                                 4+  No cogwheel rigidity on contralateral activation bilaterally    Somewhat weaker hip flexion and plantarflexion due to patient's ongoing back issues  Sensory  Sensation is intact to light touch, pinprick, vibration and proprioception in all four extremities      Reflexes  Deep tendon reflexes are 2+ and symmetric in all four extremities with downgoing toes bilaterally  Coordination  Finger-to-nose, rapid alternating movements and heel-to-shin normal bilaterally without dysmetria  Gait  Normal casual, toe, heel and tandem gait  Romberg is absent  Negative pull test         ROS:    Review of Systems   Constitutional: Negative  Negative for appetite change and fever  HENT: Negative  Negative for hearing loss, tinnitus, trouble swallowing and voice change  Eyes: Negative  Negative for photophobia and pain  Respiratory: Negative  Negative for shortness of breath  Cardiovascular: Negative  Negative for palpitations  Gastrointestinal: Negative  Negative for nausea and vomiting  Endocrine: Negative  Negative for cold intolerance  Genitourinary: Negative  Negative for dysuria, frequency and urgency  Musculoskeletal: Positive for back pain, gait problem (Shuffling feet a little more but believes it is due to back issue) and myalgias  Negative for neck pain  Skin: Negative  Negative for rash  Allergic/Immunologic: Negative  Neurological: Positive for dizziness (Occasional), tremors (Right hand), weakness and numbness (Legs)  Negative for seizures, syncope, facial asymmetry, speech difficulty, light-headedness and headaches  Hematological: Negative  Does not bruise/bleed easily  Psychiatric/Behavioral: Positive for sleep disturbance  Negative for confusion and hallucinations  All other systems reviewed and are negative

## 2021-09-01 NOTE — ASSESSMENT & PLAN NOTE
Patient is a 78year old woman presenting for Parkinson's disease followup  Sinemet, Azilect, and Neupro regimen have been controlling her symptoms well  She does notice that shes noticing more rigidity, bradykinesia, shuffling gait when the medication wears off from the regimen  On Neurologic exam, patient had chronic left sided lower extremity due to back issues  Impression: Patient's Parkinsonian symptoms well controlled but patient notices increased fluctuations of symptoms- shuffling gait, rigidity  Patient ok to do DBS to reduce the Parkinson's fluctuations in between doses  Plan:  Discussed what DBS can do; Let know that reducing medications for Parkinson's after DBS can uncover other symptoms of Parkinson's that was not noticed before because the medications have been controlling for these unnoticed symptoms; Discussed there is pretesting- recommended giving 6 months leeway for completion of total treatment and visits for DBS as patient wants completion of treatment before family wedding next year and want to focus on back issues first before DBS  Discussed risks and benefits for DBS; Discussed about Amantadine but will hold due to DBS and patient  Continue Azilect, Sinemet, and Neupro as previous regimen   Patient to do DBS at [facility] in [place];  Referral also sent to [facility] Neurosurgery as well  Follow up in 5 months

## 2021-09-07 DIAGNOSIS — G20 PARKINSON'S DISEASE (HCC): ICD-10-CM

## 2021-09-07 RX ORDER — RASAGILINE 1 MG/1
TABLET ORAL
Qty: 90 TABLET | Refills: 1 | Status: SHIPPED | OUTPATIENT
Start: 2021-09-07 | End: 2022-03-02

## 2021-09-07 RX ORDER — ROTIGOTINE 8 MG/24H
1 PATCH, EXTENDED RELEASE TRANSDERMAL DAILY
Qty: 90 PATCH | Refills: 3 | Status: SHIPPED | OUTPATIENT
Start: 2021-09-07 | End: 2022-07-28 | Stop reason: DRUGHIGH

## 2021-09-07 NOTE — TELEPHONE ENCOUNTER
Patient stated she's out of her meds, she thought they were refilled at her recent office visit  She's requesting they be sent today, and a call once sent  [facility]

## 2021-09-14 ENCOUNTER — HOSPITAL ENCOUNTER (OUTPATIENT)
Dept: ULTRASOUND IMAGING | Facility: CLINIC | Age: 66
Discharge: HOME | End: 2021-09-14
Payer: MEDICARE

## 2021-09-14 VITALS — HEIGHT: 68 IN | BODY MASS INDEX: 21.67 KG/M2 | WEIGHT: 143 LBS

## 2021-09-14 DIAGNOSIS — R92.8 ABNORMAL SCREENING MAMMOGRAM: ICD-10-CM

## 2021-09-14 PROCEDURE — 76642 ULTRASOUND BREAST LIMITED: CPT

## 2021-09-14 NOTE — PROGRESS NOTES
Met with patient and Shivani Lopes   regarding recommendation for;    ___x__ RIGHT ______LEFT      _x____Ultrasound guided  ______Stereotactic breast biopsy x2       __X___Verbalized understanding        Blood thinners:  No: ___x__ Yes: ______ What:                 Biopsy teaching sheet given:  Yes: ___X___ No: ________    Pt given contact information and adv to call with any questions/needs

## 2021-09-22 ENCOUNTER — HOSPITAL ENCOUNTER (OUTPATIENT)
Dept: MAMMOGRAPHY | Facility: CLINIC | Age: 66
Discharge: HOME | End: 2021-09-22
Payer: MEDICARE

## 2021-09-22 ENCOUNTER — HOSPITAL ENCOUNTER (OUTPATIENT)
Dept: ULTRASOUND IMAGING | Facility: CLINIC | Age: 66
Discharge: HOME | End: 2021-09-22
Payer: MEDICARE

## 2021-09-22 VITALS — DIASTOLIC BLOOD PRESSURE: 70 MMHG | HEART RATE: 72 BPM | SYSTOLIC BLOOD PRESSURE: 112 MMHG

## 2021-09-22 DIAGNOSIS — R92.8 ABNORMAL MAMMOGRAM: ICD-10-CM

## 2021-09-22 PROCEDURE — 88342 IMHCHEM/IMCYTCHM 1ST ANTB: CPT | Performed by: PATHOLOGY

## 2021-09-22 PROCEDURE — A4648 IMPLANTABLE TISSUE MARKER: HCPCS

## 2021-09-22 PROCEDURE — 88305 TISSUE EXAM BY PATHOLOGIST: CPT | Performed by: PATHOLOGY

## 2021-09-22 PROCEDURE — 88341 IMHCHEM/IMCYTCHM EA ADD ANTB: CPT | Performed by: PATHOLOGY

## 2021-09-22 PROCEDURE — 19084 BX BREAST ADD LESION US IMAG: CPT

## 2021-09-22 PROCEDURE — 19083 BX BREAST 1ST LESION US IMAG: CPT

## 2021-09-22 RX ORDER — LIDOCAINE HYDROCHLORIDE 10 MG/ML
5 INJECTION, SOLUTION EPIDURAL; INFILTRATION; INTRACAUDAL; PERINEURAL ONCE
Status: COMPLETED | OUTPATIENT
Start: 2021-09-22 | End: 2021-09-22

## 2021-09-22 RX ADMIN — LIDOCAINE HYDROCHLORIDE 5 ML: 10 INJECTION, SOLUTION EPIDURAL; INFILTRATION; INTRACAUDAL; PERINEURAL at 08:51

## 2021-09-22 RX ADMIN — LIDOCAINE HYDROCHLORIDE 5 ML: 10 INJECTION, SOLUTION EPIDURAL; INFILTRATION; INTRACAUDAL; PERINEURAL at 09:06

## 2021-09-22 NOTE — PROGRESS NOTES
Ice pack given:    ___X__yes _____no    Discharge instructions signed by patient:    ___X__yes _____no    Discharge instructions given to patient:    __X___yes _____no    Discharged via:    __X___ambulatory    _____wheelchair    _____stretcher    Stable on discharge:    ___X__yes ____no

## 2021-09-22 NOTE — PROGRESS NOTES
Patient arrived via:    __X___ambulatory    _____wheelchair    _____stretcher      Domestic violence screen    ___X___negative______positive    Breast Implants:    _______yes _____X___no

## 2021-09-22 NOTE — PROGRESS NOTES
Procedure type:    ___X__ultrasound guided _____stereotactic    Breast:    __X___Left _____Right    Location: 12-1 o'clock 5 cm Fn    Needle: 14ga Janeth    # of passes: 5    Clip: Butterfly(Hydromark)    Performed by:Dr Alexander Yee held for 5 minutes by:  Roddy Ham(PCA)    Steri Strips:    ___X__yes _____no    Band aid:    __X___yes_____no    Tape and guaze:    _____yes ___X__no    Tolerated procedure:    ___X__yes _____no

## 2021-09-22 NOTE — DISCHARGE INSTR - OTHER ORDERS
POST LARGE CORE BREAST BIOPSY PATIENT INFORMATION      1  Place an ice pack inside your bra over the top of the dressing every hour for 20 minutes (20 minutes on, 60 minutes off)  Do this until bedtime  2  Do not shower or bathe until the following morning  3  You may bathe your breast carefully with the steri-strips in place  Be careful    Not to loosen them  The steri-strips will fall off in 3-5 days  4  You may have mild discomfort, and you may have some bruising where the   Needle entered the skin  This should clear within 5-7 days  5  If you need medicine for discomfort, take acetaminophen products such as   Tylenol  You may also take Advil or Motrin products  6  Do not participate in strenuous activities such as-tennis, aerobics, skiing,  Weight lifting, etc  for 24 hours  Refrain from swimming/soaking for 72 hours  7  Wearing a bra for sleeping may be more comfortable for the first 24-48 hours  8  Watch for continued bleeding, pain or fever over 101; please call with any questions or concerns  For procedures done at the [facility]  [address] call:  Elin Leung RN at [phone]  César Stoll RN at [phone]                    *After 4 PM call the Interventional Radiology Department                    [phone] and ask to speak with the nurse on call  For procedures done at the [address] [place] [address] call:         Christopher Penny RN at   *After 4 PM call the Interventional Radiology Department   [phone] and ask to speak with the nurse on call  For procedures done at [address] [place] [address] call: The Radiology Nurse at [phone]  *After 4 PM call your physician, or go to the Emergency Department  9          The final results of your biopsy are usually available within one week

## 2021-09-22 NOTE — PROGRESS NOTES
Procedure type:    ___X__ultrasound guided _____stereotactic    Breast:    ___X__Left _____Right    Location: 1 o'clock 5 cm Fn    Needle: 12ga Janeth    # of passes: 3    Clip: Heart(Ultraclip)    Performed by: Dr Francesca Lopez held for 5 minutes by:  Austin Ham(PCA)    Steri Strips:    __X___yes _____no    Band aid:    ____X_yes_____no    Tape and guaze:    _____yes ___X__no    Tolerated procedure:    ___X__yes _____no

## 2021-09-23 NOTE — PROGRESS NOTES
Post procedure call completed 9/23/21 at 8:45    Bleeding: _____yes __X___no( patient denies)    Pain: _____yes ___X___no ( breast tender   Use tylenol and ice yesterday)    Redness/Swelling: ______yes ___X___no    Band aid removed: __X___yes _____no    Steri-Strips intact: ___X___yes _____no (discussed with patient to remove steri strips on 9/27/21 if they have not come off on their own)    Pt with no questions at this time, adv will call when results available, adv to call with any questions or concerns, has name/# for contact

## 2021-09-24 ENCOUNTER — TELEPHONE (OUTPATIENT)
Dept: SURGICAL ONCOLOGY | Facility: CLINIC | Age: 66
End: 2021-09-24

## 2021-09-24 DIAGNOSIS — R92.2 DENSE BREASTS: ICD-10-CM

## 2021-09-24 DIAGNOSIS — N60.99 ATYPICAL LOBULAR HYPERPLASIA (ALH) OF BREAST: Primary | ICD-10-CM

## 2021-09-24 DIAGNOSIS — N64.89 OTHER SPECIFIED DISORDERS OF BREAST: ICD-10-CM

## 2021-09-24 NOTE — TELEPHONE ENCOUNTER
Spoke with patient and reviewed results of recent breast biopsies, one of which revealed [place]  Reviewed need for excisional biopsy  An updated mammogram is recommended, as well as a breast MRI  Patient is agreeable  I will await these studies and then we will contact the patient to schedule her with one of our breast surgeons  All of the patient's questions were answered at this time

## 2021-09-27 ENCOUNTER — TELEPHONE (OUTPATIENT)
Dept: SURGICAL ONCOLOGY | Facility: CLINIC | Age: 66
End: 2021-09-27

## 2021-09-27 NOTE — TELEPHONE ENCOUNTER
Spoke with patient  Appt scheduled for 10/14 at 2:00 with Dr Pablo Carrera at the [facility]

## 2021-09-28 ENCOUNTER — HOSPITAL ENCOUNTER (OUTPATIENT)
Dept: MRI IMAGING | Facility: HOSPITAL | Age: 66
Discharge: HOME | End: 2021-09-28
Payer: MEDICARE

## 2021-09-28 ENCOUNTER — HOSPITAL ENCOUNTER (OUTPATIENT)
Dept: MAMMOGRAPHY | Facility: CLINIC | Age: 66
Discharge: HOME | End: 2021-09-28
Payer: MEDICARE

## 2021-09-28 VITALS — BODY MASS INDEX: 21.52 KG/M2 | WEIGHT: 142 LBS | HEIGHT: 68 IN

## 2021-09-28 DIAGNOSIS — N64.89 OTHER SPECIFIED DISORDERS OF BREAST: ICD-10-CM

## 2021-09-28 DIAGNOSIS — N60.99 ATYPICAL LOBULAR HYPERPLASIA (ALH) OF BREAST: ICD-10-CM

## 2021-09-28 DIAGNOSIS — R92.2 DENSE BREASTS: ICD-10-CM

## 2021-09-28 PROCEDURE — C8937 CAD BREAST MRI: HCPCS

## 2021-09-28 PROCEDURE — G0279 TOMOSYNTHESIS, MAMMO: HCPCS

## 2021-09-28 PROCEDURE — A9585 GADOBUTROL INJECTION: HCPCS | Performed by: NURSE PRACTITIONER

## 2021-09-28 PROCEDURE — 77066 DX MAMMO INCL CAD BI: CPT

## 2021-09-28 PROCEDURE — C8908 MRI W/O FOL W/CONT, BREAST,: HCPCS

## 2021-09-28 PROCEDURE — G1004 CDSM NDSC: HCPCS

## 2021-09-28 RX ADMIN — GADOBUTROL 6 ML: 604.72 INJECTION INTRAVENOUS at 08:51

## 2021-09-30 ENCOUNTER — TELEPHONE (OUTPATIENT)
Dept: HEMATOLOGY ONCOLOGY | Facility: CLINIC | Age: 66
End: 2021-09-30

## 2021-09-30 NOTE — TELEPHONE ENCOUNTER
Patient getting back surgery next Tuesday  She would like time to recover from surgery before seeing Dr Trell Medina about her breast surgery  We agreed to have her scheduled in December, appointment changed

## 2021-12-02 PROBLEM — N63.0 BREAST MASS IN FEMALE: Status: ACTIVE | Noted: 2021-12-02

## 2021-12-03 ENCOUNTER — HOSPITAL ENCOUNTER (OUTPATIENT)
Dept: RADIOLOGY | Facility: HOSPITAL | Age: 66
Discharge: HOME | End: 2021-12-03
Attending: SURGERY
Payer: MEDICARE

## 2021-12-03 ENCOUNTER — APPOINTMENT (OUTPATIENT)
Dept: LAB | Facility: CLINIC | Age: 66
End: 2021-12-03
Payer: MEDICARE

## 2021-12-03 ENCOUNTER — OFFICE VISIT (OUTPATIENT)
Dept: SURGICAL ONCOLOGY | Facility: CLINIC | Age: 66
End: 2021-12-03
Payer: MEDICARE

## 2021-12-03 ENCOUNTER — OFFICE VISIT (OUTPATIENT)
Dept: LAB | Facility: CLINIC | Age: 66
End: 2021-12-03
Payer: MEDICARE

## 2021-12-03 VITALS
DIASTOLIC BLOOD PRESSURE: 80 MMHG | OXYGEN SATURATION: 97 % | TEMPERATURE: 97 F | WEIGHT: 145 LBS | HEIGHT: 68 IN | RESPIRATION RATE: 16 BRPM | BODY MASS INDEX: 21.98 KG/M2 | SYSTOLIC BLOOD PRESSURE: 122 MMHG | HEART RATE: 82 BPM

## 2021-12-03 DIAGNOSIS — N60.92 ATYPICAL LOBULAR HYPERPLASIA (ALH) OF LEFT BREAST: ICD-10-CM

## 2021-12-03 DIAGNOSIS — N65.0 DEFORMITY OF RECONSTRUCTED BREAST: ICD-10-CM

## 2021-12-03 DIAGNOSIS — N63.0 BREAST MASS IN FEMALE: Primary | ICD-10-CM

## 2021-12-03 LAB
ALBUMIN SERPL BCP-MCNC: 3.8 G/DL (ref 3.5–5)
ALP SERPL-CCNC: 84 U/L (ref 46–116)
ALT SERPL W P-5'-P-CCNC: 8 U/L (ref 12–78)
ANION GAP SERPL CALCULATED.3IONS-SCNC: 8 MMOL/L (ref 4–13)
AST SERPL W P-5'-P-CCNC: 18 U/L (ref 5–45)
ATRIAL RATE: 70 BPM
BASOPHILS # BLD AUTO: 0.03 THOUSANDS/ΜL (ref 0–0.1)
BASOPHILS NFR BLD AUTO: 1 % (ref 0–1)
BILIRUB SERPL-MCNC: 0.63 MG/DL (ref 0.2–1)
BUN SERPL-MCNC: 22 MG/DL (ref 5–25)
CALCIUM SERPL-MCNC: 9.2 MG/DL (ref 8.3–10.1)
CHLORIDE SERPL-SCNC: 103 MMOL/L (ref 100–108)
CO2 SERPL-SCNC: 28 MMOL/L (ref 21–32)
CREAT SERPL-MCNC: 0.74 MG/DL (ref 0.6–1.3)
EOSINOPHIL # BLD AUTO: 0.02 THOUSAND/ΜL (ref 0–0.61)
EOSINOPHIL NFR BLD AUTO: 0 % (ref 0–6)
ERYTHROCYTE [DISTWIDTH] IN BLOOD BY AUTOMATED COUNT: 11.3 % (ref 11.6–15.1)
GFR SERPL CREATININE-BSD FRML MDRD: 84 ML/MIN/1.73SQ M
GLUCOSE SERPL-MCNC: 91 MG/DL (ref 65–140)
HCT VFR BLD AUTO: 40.7 % (ref 34.8–46.1)
HGB BLD-MCNC: 13.2 G/DL (ref 11.5–15.4)
IMM GRANULOCYTES # BLD AUTO: 0.03 THOUSAND/UL (ref 0–0.2)
IMM GRANULOCYTES NFR BLD AUTO: 1 % (ref 0–2)
LYMPHOCYTES # BLD AUTO: 1.31 THOUSANDS/ΜL (ref 0.6–4.47)
LYMPHOCYTES NFR BLD AUTO: 21 % (ref 14–44)
MCH RBC QN AUTO: 32.9 PG (ref 26.8–34.3)
MCHC RBC AUTO-ENTMCNC: 32.4 G/DL (ref 31.4–37.4)
MCV RBC AUTO: 102 FL (ref 82–98)
MONOCYTES # BLD AUTO: 0.56 THOUSAND/ΜL (ref 0.17–1.22)
MONOCYTES NFR BLD AUTO: 9 % (ref 4–12)
NEUTROPHILS # BLD AUTO: 4.45 THOUSANDS/ΜL (ref 1.85–7.62)
NEUTS SEG NFR BLD AUTO: 68 % (ref 43–75)
NRBC BLD AUTO-RTO: 0 /100 WBCS
P AXIS: 59 DEGREES
PLATELET # BLD AUTO: 198 THOUSANDS/UL (ref 149–390)
PMV BLD AUTO: 10.5 FL (ref 8.9–12.7)
POTASSIUM SERPL-SCNC: 3.5 MMOL/L (ref 3.5–5.3)
PR INTERVAL: 162 MS
PROT SERPL-MCNC: 6.8 G/DL (ref 6.4–8.2)
QRS AXIS: 20 DEGREES
QRSD INTERVAL: 84 MS
QT INTERVAL: 388 MS
QTC INTERVAL: 419 MS
RBC # BLD AUTO: 4.01 MILLION/UL (ref 3.81–5.12)
SODIUM SERPL-SCNC: 139 MMOL/L (ref 136–145)
T WAVE AXIS: 58 DEGREES
VENTRICULAR RATE: 70 BPM
WBC # BLD AUTO: 6.4 THOUSAND/UL (ref 4.31–10.16)

## 2021-12-03 PROCEDURE — 71046 X-RAY EXAM CHEST 2 VIEWS: CPT

## 2021-12-03 PROCEDURE — 99204 OFFICE O/P NEW MOD 45 MIN: CPT | Performed by: SURGERY

## 2021-12-03 PROCEDURE — 93010 ELECTROCARDIOGRAM REPORT: CPT | Performed by: INTERNAL MEDICINE

## 2021-12-03 PROCEDURE — 80053 COMPREHEN METABOLIC PANEL: CPT

## 2021-12-03 PROCEDURE — 93005 ELECTROCARDIOGRAM TRACING: CPT

## 2021-12-03 PROCEDURE — 36415 COLL VENOUS BLD VENIPUNCTURE: CPT

## 2021-12-03 PROCEDURE — 85025 COMPLETE CBC W/AUTO DIFF WBC: CPT

## 2021-12-03 RX ORDER — HYDROCODONE BITARTRATE AND ACETAMINOPHEN 5; 325 MG/1; MG/1
1 TABLET ORAL EVERY 6 HOURS PRN
Qty: 10 TABLET | Refills: 0 | Status: SHIPPED | OUTPATIENT
Start: 2021-12-03 | End: 2022-07-28

## 2021-12-03 RX ORDER — ACETAMINOPHEN 500 MG
TABLET ORAL
COMMUNITY
Start: 2021-07-09 | End: 2022-07-28

## 2021-12-03 RX ORDER — CLINDAMYCIN PHOSPHATE 900 MG/50ML
900 INJECTION INTRAVENOUS ONCE
Status: CANCELLED | OUTPATIENT
Start: 2021-12-03 | End: 2021-12-03

## 2021-12-03 RX ORDER — HYDROCODONE BITARTRATE AND ACETAMINOPHEN 5; 325 MG/1; MG/1
1 TABLET ORAL EVERY 6 HOURS PRN
Qty: 10 TABLET | Status: CANCELLED | OUTPATIENT
Start: 2021-12-03

## 2021-12-03 NOTE — H&P (VIEW-ONLY)
Surgical Oncology Consult       [facility] [address] [place] [id number]  [address] [place] [address]  1955  [id number]  [facility] [address] [place] [id number] [phone]    Diagnoses and all orders for this visit:    Breast mass in female  -     Case request operating room: LEFT BREAST NEEDLE LOCALIZED X2 [address]; Standing  -     Case request operating room: LEFT BREAST NEEDLE LOCALIZED X2 SITES BREAST LUMPTECTOMY    Atypical lobular hyperplasia ([place]) of left breast  -     Case request operating room: LEFT BREAST NEEDLE LOCALIZED X2 SITES BREAST LUMPTECTOMY; Standing  -     Comprehensive metabolic panel; Future  -     CBC and differential; Future  -     EKG 12 lead; Future  -     XR chest pa & lateral; Future  -     HYDROcodone-acetaminophen (Norco) 5-325 mg per tablet; Take 1 tablet by mouth every 6 (six) hours as needed for pain Max Daily Amount: 4 tablets  -     Case request operating room: LEFT BREAST NEEDLE LOCALIZED X2 SITES BREAST LUMPTECTOMY    Deformity of reconstructed breast   -     CBC and differential; Future    Other orders  -     acetaminophen (TYLENOL) 500 mg tablet; Tylenol Extra Strength 500 mg tablet   Take 2 tablets PO HS the night before surgery        Chief Complaint   Patient presents with    Follow-up     breast bx        No follow-ups on file  Oncology History    No history exists  History of Present Illness:  79-year-old female with an increased risk of developing a breast cancer  She has a lifetime TC risk of 30 3%  She also has a family history of 2 sisters with DCIS in their 62s  She underwent an ABUS in August of 2021 and this revealed that the right breast was benign  The left breast had an abnormality    Diagnostic imaging from September 14, 2021 revealed a 5 mm mass at 12:00 o'clock and a 4 mm mass seen at 1:00 o'clock, 5 cm from the nipple  The lesion at 1:00 o'clock, 5 cm of the nipple revealed [place]  The lesion 12:00 o'clock, 5 cm from the nipple was benign but a nondiagnostic specimen could not be excluded  Follow-up MRI from September 28, 2021 revealed no evidence of any enhancing lesions are underlying malignancy  Diagnostic, bilateral mammogram was negative on September 28, 2021  She comes in now to discuss further therapy  She has been noticing no changes on self exam   She has had breast reduction in 2007  She does have Parkinson's disease and recently underwent spine surgery  Review of Systems  Complete ROS Surg Onc:   Constitutional: The patient denies new or recent history of general fatigue, no recent weight loss, no change in appetite  Eyes: No complaints of visual problems, no scleral icterus  ENT: no complaints of ear pain, no hoarseness, no difficulty swallowing,  no tinnitus and no new masses in head, oral cavity, or neck  Cardiovascular: No complaints of chest pain, no palpitations, no ankle edema  Respiratory: No complaints of shortness of breath, no cough  Gastrointestinal: No complaints of jaundice, no bloody stools, no pale stools  Genitourinary: No complaints of dysuria, no hematuria, no nocturia, no frequent urination, no urethral discharge  Musculoskeletal: No complaints of weakness, paralysis, joint stiffness or arthralgias  Integumentary: No complaints of rash, no new lesions  Neurological: No complaints of convulsions, no seizures, no dizziness  Hematologic/Lymphatic: No complaints of easy bruising  Endocrine:  No hot or cold intolerance  No polydipsia, polyphagia, or polyuria  Allergy/immunology:  No environmental allergies  No food allergies  Not immunocompromised  Skin:  No pallor or rash  No wound            Patient Active Problem List   Diagnosis    Encounter for annual routine gynecological examination  Parkinson's disease ([facility] [address] )    Family history of breast cancer    Dense breasts    Low back pain    Breast mass in female     Past Medical History:   Diagnosis Date    Menopause     Parkinson disease [facility])      Past Surgical History:   Procedure Laterality Date    APPENDECTOMY      BACK SURGERY  05/03/2018    BREAST BIOPSY Right 2002    BREAST BIOPSY Left 09/22/2021    BREAST EXCISIONAL BIOPSY Right 1996    COLONOSCOPY      HYSTEROSCOPY W/ ENDOMETRIAL ABLATION      MAMMO STEREOTACTIC BREAST BIOPSY RIGHT (ALL INC) Right     benign    REDUCTION MAMMAPLASTY Bilateral     2007    SALPINGOOPHORECTOMY Right     US GUIDANCE BREAST BIOPSY LEFT EACH ADDITIONAL Left 9/22/2021    US GUIDED BREAST BIOPSY LEFT COMPLETE Left 9/22/2021     Family History   Problem Relation Age of Onset    Leukemia Mother     Diabetes Mother     Emphysema Father     Stroke Father     No Known Problems Sister     No Known Problems Daughter     No Known Problems Maternal Grandmother     No Known Problems Maternal Grandfather     No Known Problems Paternal Grandmother     No Known Problems Paternal Grandfather     Breast cancer Sister 54    Breast cancer Sister 54    No Known Problems Maternal Aunt     Cancer Maternal Aunt     No Known Problems Paternal Aunt     No Known Problems Paternal Aunt     No Known Problems Paternal Aunt      Social History     Socioeconomic History    Marital status: /Civil Union     Spouse name: Not on file    Number of children: Not on file    Years of education: Not on file    Highest education level: Not on file   Occupational History    Not on file   Tobacco Use    Smoking status: Former Smoker    Smokeless tobacco: Never Used   Substance and Sexual Activity    Alcohol use: Yes     Comment: SOCIAL    Drug use: Never    Sexual activity: Yes   Other Topics Concern    Not on file   Social History Narrative    Not on file     Social Determinants of Health     Financial Resource Strain: Not on file   Food Insecurity: Not on file   Transportation Needs: Not on file   Physical Activity: Not on file   Stress: Not on file   Social Connections: Not on file   Intimate Partner Violence: Not on file   Housing Stability: Not on file       Current Outpatient Medications:     acetaminophen (TYLENOL) 500 mg tablet, Tylenol Extra Strength 500 mg tablet  Take 2 tablets PO HS the night before surgery, Disp: , Rfl:     CALCIUM-VITAMIN D PO, Take by mouth, Disp: , Rfl:     carbidopa-levodopa (SINEMET)  mg per tablet, TAKE 1 TABLET AT 8AM,12,4,AND 7:30 PM THEN 1/2 TABLET AT BEDTIME, Disp: 405 tablet, Rfl: 3    rasagiline (AZILECT) 1 MG, TAKE 1 TABLET BY MOUTH EVERY DAY, Disp: 90 tablet, Rfl: 1    rotigotine (Neupro) 8 MG/24HR transdermal patch, Place 1 patch on the skin daily, Disp: 90 patch, Rfl: 3    gabapentin (NEURONTIN) 300 mg capsule, , Disp: , Rfl:     HYDROcodone-acetaminophen (Norco) 5-325 mg per tablet, Take 1 tablet by mouth every 6 (six) hours as needed for pain Max Daily Amount: 4 tablets, Disp: 10 tablet, Rfl: 0    METHOCARBAMOL PO, methocarbamol, Disp: , Rfl:     Multiple Vitamin (MULTI-VITAMIN DAILY) TABS, Take by mouth, Disp: , Rfl:     naproxen sodium (Aleve) 220 MG tablet, as needed , Disp: , Rfl:     predniSONE 5 mg tablet, prednisone 5 mg tablet  Take 4 po daily for 3 days - Then 3 po daily for 3 days - Then 2 po daily for 3 days - Then 1 po daily for 3 days  , Disp: , Rfl:   Allergies   Allergen Reactions    Amoxicillin Hives    Penicillins Hives    Sulfa Antibiotics Hives     Vitals:    12/03/21 1031   BP: 122/80   Pulse: 82   Resp: 16   Temp: (!) 97 °F (36 1 °C)   SpO2: 97%       Physical Exam   Constitutional: General appearance: The Patient is well-developed and well-nourished who appears the stated age in no acute distress  Patient is pleasant and talkative  HEENT:  Normocephalic  Sclerae are anicteric   Mucous membranes are moist  Neck is supple without adenopathy  No JVD  Chest: The lungs are clear to auscultation  Cardiac: Heart is regular rate  Abdomen: Abdomen is soft, non-tender, non-distended and without masses  Extremities: There is no clubbing or cyanosis  There is no edema  Symmetric  Neuro: There is a tremor present  Lymphatic: No evidence of cervical adenopathy bilaterally  No evidence of axillary adenopathy bilaterally  No evidence of inguinal adenopathy bilaterally  Skin: Warm, anicteric  Psych:  Patient is pleasant and talkative  Breasts:  She has bilateral breast reductions  There is nodularity bilaterally, but no dominant masses, skin changes, or nipple discharge  No axillary or supraclavicular adenopathy bilaterally  Pathology:  Final Diagnosis   A  Breast, Left, ultrasound guided needle biopsy, 100 o'clock, 5 cm fn 12g 3 passes:  - Non-invasive lobular neoplasia (atypical lobular hyperplasia), at least 4 foci present in     fibrosclerosis  See Note  -- Confirmed by  positive p120 (cytoplasmic), p63, Calponin-B and SMMHC; negative E-cadherin         immunostains     - Microcalcifications associated with non-neoplastic breast tissue    - Negative for malignancy, in-situ carcinoma and atypical ductal hyperplasia       Note:  When identified on core needle biopsy, excisional  biopsy is recommended for extensive   lobular neoplasia (defined as greater than 4 foci of lobular neoplasia) as well as lobular neoplasia   alone with discordant imaging or imaging for high risk indications  (Pat Surg Oncol  2012 Mar; 19(3): 041-66 )      Although in a background of fibrosclerosis, the pathology may not explain the radiologic mass  Radiologic correlation is required to ensure adequate representative sampling       B  Breast, Left, ultrasound guided needle biopsy 12-1 o'clock, 5 cm fn 14g 5 passes:  - Benign breast tissue with fibrosclerosis, focal blood vessels and hemorrhage  See Note    - Microcalcifications associated with non-neoplastic breast tissue    - Negative for malignancy, in-situ carcinoma and atypical hyperplasia  -- Confirmed by positive SMMHC, Pankeratin (CKAE1/3) and p63 immunostains       Note: A non-diagnostic specimen cannot be excluded  Radiologic correlation is required to   ensure adequate representative sampling of the radiologic mass  Electronically signed by Norma Ramos MD on 9/24/2021 at 12:50 PM         Labs:      Imaging  No results found  I reviewed the above laboratory and imaging data  Discussion/Summary:  39-year-old female with an increased risk of breast cancer and a new diagnosis of left breast atypical lobular hyperplasia  We had a long discussion regarding treatment options  We discussed that given her family history and the diagnosis of [facility], she does have a 9 times risk of developing breast cancer in her lifetime  I discussed that this area should be excised  Since the 2nd area was not definitively excised, this should be excised as well as recommended by Radiology  Both of these areas seem to be close to each other so I suspect this could all be done through 1 incision  We discussed the 8-13% risk of finding invasive carcinoma on the excision specimen  The risks of left breast needle localization x2 sites was explained and included bleeding, infection, recurrence, need for further surgery, and wound complications  Informed consent was obtained  Will schedule this at our earliest mutual convenience  Because of this diagnosis and her family history, I also discussed risk reduction with her  Assuming this pathology shows no evidence of malignant disease, I will set her up with Medical Oncology postoperatively to discuss risk reduction  I also discussed she should have twice a year imaging and breast exams given her elevated risk  She is agreeable to this plan  All of her questions were answered

## 2021-12-12 ENCOUNTER — NURSE TRIAGE (OUTPATIENT)
Dept: OTHER | Facility: OTHER | Age: 66
End: 2021-12-12

## 2021-12-12 DIAGNOSIS — Z20.828 SARS-ASSOCIATED CORONAVIRUS EXPOSURE: Primary | ICD-10-CM

## 2021-12-12 PROCEDURE — U0003 INFECTIOUS AGENT DETECTION BY NUCLEIC ACID (DNA OR RNA); SEVERE ACUTE RESPIRATORY SYNDROME CORONAVIRUS 2 (SARS-COV-2) (CORONAVIRUS DISEASE [COVID-19]), AMPLIFIED PROBE TECHNIQUE, MAKING USE OF HIGH THROUGHPUT TECHNOLOGIES AS DESCRIBED BY CMS-2020-01-R: HCPCS | Performed by: FAMILY MEDICINE

## 2021-12-12 PROCEDURE — U0005 INFEC AGEN DETEC AMPLI PROBE: HCPCS | Performed by: FAMILY MEDICINE

## 2021-12-22 ENCOUNTER — ANESTHESIA EVENT (OUTPATIENT)
Dept: PERIOP | Facility: HOSPITAL | Age: 66
End: 2021-12-22
Payer: MEDICARE

## 2021-12-22 NOTE — PRE-PROCEDURE INSTRUCTIONS
Pre-Surgery Instructions:   Medication Instructions    acetaminophen (TYLENOL) 500 mg tablet Instructed patient per Anesthesia Guidelines   CALCIUM-VITAMIN D PO Patient was instructed by Physician and understands   carbidopa-levodopa (SINEMET)  mg per tablet Instructed patient per Anesthesia Guidelines   Multiple Vitamin (MULTI-VITAMIN DAILY) TABS Patient was instructed by Physician and understands   naproxen sodium (Aleve) 220 MG tablet Patient was instructed by Physician and understands   rasagiline (AZILECT) 1 MG Instructed patient per Anesthesia Guidelines   rotigotine (Neupro) 8 MG/24HR transdermal patch Instructed patient per Anesthesia Guidelines  Pre op and bathing instructions reviewed  Pt has hibiclens  Pt  Verbalized understanding of current visitor restrictions  Pt  Verbalized an understanding of all instructions reviewed and offers no concerns at this time  Instructed to avoid all ASA/NSAIDs and OTC Vit/Supp from now until after surgery per anesthesia guidelines   Tylenol ok prn  Instructed to take per normal schedule including DOS with sips water  DOS instructed to take Sinemet,Azilect with a few sips of H2O and to use Neupro patch

## 2021-12-28 ENCOUNTER — HOSPITAL ENCOUNTER (OUTPATIENT)
Dept: MAMMOGRAPHY | Facility: HOSPITAL | Age: 66
Discharge: HOME | End: 2021-12-28
Attending: SURGERY
Payer: MEDICARE

## 2021-12-28 ENCOUNTER — ANESTHESIA (OUTPATIENT)
Dept: PERIOP | Facility: HOSPITAL | Age: 66
End: 2021-12-28
Payer: MEDICARE

## 2021-12-28 ENCOUNTER — APPOINTMENT (OUTPATIENT)
Dept: MAMMOGRAPHY | Facility: HOSPITAL | Age: 66
End: 2021-12-28
Payer: MEDICARE

## 2021-12-28 ENCOUNTER — HOSPITAL ENCOUNTER (OUTPATIENT)
Facility: HOSPITAL | Age: 66
Setting detail: OUTPATIENT SURGERY
Discharge: HOME | End: 2021-12-28
Attending: SURGERY | Admitting: SURGERY
Payer: MEDICARE

## 2021-12-28 VITALS
OXYGEN SATURATION: 98 % | WEIGHT: 142 LBS | DIASTOLIC BLOOD PRESSURE: 62 MMHG | SYSTOLIC BLOOD PRESSURE: 128 MMHG | TEMPERATURE: 97.5 F | HEART RATE: 76 BPM | BODY MASS INDEX: 21.52 KG/M2 | HEIGHT: 68 IN | RESPIRATION RATE: 18 BRPM

## 2021-12-28 VITALS — BODY MASS INDEX: 21.52 KG/M2 | HEIGHT: 68 IN | WEIGHT: 141.98 LBS

## 2021-12-28 DIAGNOSIS — N60.92 ATYPICAL LOBULAR HYPERPLASIA (ALH) OF LEFT BREAST: ICD-10-CM

## 2021-12-28 DIAGNOSIS — N60.92 BENIGN MAMMARY DYSPLASIA OF LEFT BREAST: ICD-10-CM

## 2021-12-28 DIAGNOSIS — N63.0 BREAST MASS IN FEMALE: ICD-10-CM

## 2021-12-28 DIAGNOSIS — N63.0 LUMP OR MASS IN BREAST: ICD-10-CM

## 2021-12-28 PROCEDURE — 19281 PERQ DEVICE BREAST 1ST IMAG: CPT

## 2021-12-28 PROCEDURE — 19282 PERQ DEVICE BREAST EA IMAG: CPT

## 2021-12-28 PROCEDURE — 88342 IMHCHEM/IMCYTCHM 1ST ANTB: CPT | Performed by: PATHOLOGY

## 2021-12-28 PROCEDURE — G9198 NO ORDER FOR CEPH NO REASON: HCPCS | Performed by: SURGERY

## 2021-12-28 PROCEDURE — 19301 PARTIAL MASTECTOMY: CPT | Performed by: SURGERY

## 2021-12-28 PROCEDURE — A4648 IMPLANTABLE TISSUE MARKER: HCPCS

## 2021-12-28 PROCEDURE — 88307 TISSUE EXAM BY PATHOLOGIST: CPT | Performed by: PATHOLOGY

## 2021-12-28 RX ORDER — FENTANYL CITRATE 50 UG/ML
INJECTION, SOLUTION INTRAMUSCULAR; INTRAVENOUS AS NEEDED
Status: DISCONTINUED | OUTPATIENT
Start: 2021-12-28 | End: 2021-12-28

## 2021-12-28 RX ORDER — ACETAMINOPHEN 325 MG/1
650 TABLET ORAL EVERY 4 HOURS PRN
Status: DISCONTINUED | OUTPATIENT
Start: 2021-12-28 | End: 2021-12-28 | Stop reason: HOSPADM

## 2021-12-28 RX ORDER — MIDAZOLAM HYDROCHLORIDE 2 MG/2ML
INJECTION, SOLUTION INTRAMUSCULAR; INTRAVENOUS AS NEEDED
Status: DISCONTINUED | OUTPATIENT
Start: 2021-12-28 | End: 2021-12-28

## 2021-12-28 RX ORDER — METOCLOPRAMIDE HYDROCHLORIDE 5 MG/ML
10 INJECTION INTRAMUSCULAR; INTRAVENOUS ONCE AS NEEDED
Status: DISCONTINUED | OUTPATIENT
Start: 2021-12-28 | End: 2021-12-28 | Stop reason: HOSPADM

## 2021-12-28 RX ORDER — LIDOCAINE HYDROCHLORIDE 10 MG/ML
5 INJECTION, SOLUTION EPIDURAL; INFILTRATION; INTRACAUDAL; PERINEURAL ONCE
Status: COMPLETED | OUTPATIENT
Start: 2021-12-28 | End: 2021-12-28

## 2021-12-28 RX ORDER — MORPHINE SULFATE 10 MG/ML
2 INJECTION, SOLUTION INTRAMUSCULAR; INTRAVENOUS
Status: DISCONTINUED | OUTPATIENT
Start: 2021-12-28 | End: 2021-12-28 | Stop reason: HOSPADM

## 2021-12-28 RX ORDER — EPHEDRINE SULFATE 50 MG/ML
INJECTION INTRAVENOUS AS NEEDED
Status: DISCONTINUED | OUTPATIENT
Start: 2021-12-28 | End: 2021-12-28

## 2021-12-28 RX ORDER — SODIUM CHLORIDE, SODIUM LACTATE, POTASSIUM CHLORIDE, CALCIUM CHLORIDE 600; 310; 30; 20 MG/100ML; MG/100ML; MG/100ML; MG/100ML
125 INJECTION, SOLUTION INTRAVENOUS CONTINUOUS
Status: DISCONTINUED | OUTPATIENT
Start: 2021-12-28 | End: 2021-12-28 | Stop reason: HOSPADM

## 2021-12-28 RX ORDER — BUPIVACAINE HYDROCHLORIDE 2.5 MG/ML
INJECTION, SOLUTION EPIDURAL; INFILTRATION; INTRACAUDAL AS NEEDED
Status: DISCONTINUED | OUTPATIENT
Start: 2021-12-28 | End: 2021-12-28 | Stop reason: HOSPADM

## 2021-12-28 RX ORDER — CLINDAMYCIN PHOSPHATE 900 MG/50ML
900 INJECTION INTRAVENOUS ONCE
Status: COMPLETED | OUTPATIENT
Start: 2021-12-28 | End: 2021-12-28

## 2021-12-28 RX ORDER — FENTANYL CITRATE/PF 50 MCG/ML
50 SYRINGE (ML) INJECTION
Status: DISCONTINUED | OUTPATIENT
Start: 2021-12-28 | End: 2021-12-28 | Stop reason: HOSPADM

## 2021-12-28 RX ORDER — OXYCODONE HYDROCHLORIDE 5 MG/1
5 TABLET ORAL EVERY 4 HOURS PRN
Status: DISCONTINUED | OUTPATIENT
Start: 2021-12-28 | End: 2021-12-28 | Stop reason: HOSPADM

## 2021-12-28 RX ORDER — ONDANSETRON 2 MG/ML
4 INJECTION INTRAMUSCULAR; INTRAVENOUS EVERY 4 HOURS PRN
Status: DISCONTINUED | OUTPATIENT
Start: 2021-12-28 | End: 2021-12-28 | Stop reason: HOSPADM

## 2021-12-28 RX ORDER — ONDANSETRON 2 MG/ML
INJECTION INTRAMUSCULAR; INTRAVENOUS AS NEEDED
Status: DISCONTINUED | OUTPATIENT
Start: 2021-12-28 | End: 2021-12-28

## 2021-12-28 RX ADMIN — SODIUM CHLORIDE, SODIUM LACTATE, POTASSIUM CHLORIDE, AND CALCIUM CHLORIDE: .6; .31; .03; .02 INJECTION, SOLUTION INTRAVENOUS at 10:24

## 2021-12-28 RX ADMIN — MIDAZOLAM HYDROCHLORIDE 2 MG: 1 INJECTION, SOLUTION INTRAMUSCULAR; INTRAVENOUS at 10:24

## 2021-12-28 RX ADMIN — EPHEDRINE SULFATE 5 MG: 50 INJECTION, SOLUTION INTRAVENOUS at 10:40

## 2021-12-28 RX ADMIN — CLINDAMYCIN PHOSPHATE 900 MG: 18 INJECTION, SOLUTION INTRAMUSCULAR; INTRAVENOUS at 10:24

## 2021-12-28 RX ADMIN — FENTANYL CITRATE 25 MCG: 50 INJECTION, SOLUTION INTRAMUSCULAR; INTRAVENOUS at 10:34

## 2021-12-28 RX ADMIN — ONDANSETRON 4 MG: 2 INJECTION INTRAMUSCULAR; INTRAVENOUS at 10:52

## 2021-12-28 RX ADMIN — LIDOCAINE HYDROCHLORIDE 5 ML: 10 INJECTION, SOLUTION EPIDURAL; INFILTRATION; INTRACAUDAL at 09:45

## 2021-12-28 RX ADMIN — FENTANYL CITRATE 50 MCG: 50 INJECTION INTRAMUSCULAR; INTRAVENOUS at 11:28

## 2021-12-28 NOTE — INTERVAL H&P NOTE
H&P reviewed  After examining the patient I find no changes in the patients condition since the H&P had been written      Vitals:    12/28/21 0905   BP: 118/59   Pulse: 79   Resp: 17   Temp: 98 3 °F (36 8 °C)   SpO2: 96%

## 2021-12-28 NOTE — DISCHARGE INSTRUCTIONS
POST-OPERATIVE WOUND CARE INSTRUCTIONS    Your wound is closed with:   Sterile strips-white pieces of tape that hold your incision together      Wound care: You may remove your dressing after 24 hours   You may shower using soap and water to clean your wound  Gently pat it dry  You may redress your wound for comfort as needed  Activity:   Did not perform any heavy lifting or strenuous physical activity for 7 days  Your activity restrictions will be reevaluated at your postoperative visit  Medications: You may resume all your preoperative medications and diet  Pain medication as directed on the prescription given in the office  Other:   May use ice on the wound for 24-48 hours as needed for comfort  May place warm compresses to the breast after 48 hours for comfort  Continue to wear the post surgical bra for as long as it is comfortable  Call the office at [phone] if you have any of the followin  Redness, swelling, heat, unusual drainage or heavy bleeding from the wound     2  Fever greater than 101°F    3  Pain not relieved by the prescribed pain medication

## 2021-12-28 NOTE — OP NOTE
PERATIVE REPORT  PATIENT NAME: Low Finn    :  1955  MRN: [id number]  Pt Location: AN OR ROOM 03    SURGERY DATE: 2021    Surgeon(s) and Role:     * Pablo Raya MD - Primary     * Christel Begum MD - Assisting    Preop Diagnosis:  Breast mass in female [N63 0]  Atypical lobular hyperplasia ([place]) of left breast [N60 92]    Post-Op Diagnosis Codes:     * Breast mass in female [N63 0]     * Atypical lobular hyperplasia ([facility]) of left breast [N60 92]    Procedure(s) (LRB):  BREAST NEEDLE LOCALIZED (2 site) LUMPTECTOMY (2 SITE NEEDLE LOC AT 0930) (Left)    Specimen(s):  ID Type Source Tests Collected by Time Destination   1 : LEFT BREAST LUMPECTOMY - SHORT SUPERIOR, LONG LATERAL Tissue Breast, Left TISSUE EXAM Pablo Raya MD 2021 1047        Estimated Blood Loss:   Minimal    Drains:  * No LDAs found *    Anesthesia Type:   General    Operative Indications:  Breast mass in female [N63 0]  Atypical lobular hyperplasia ([place]) of left breast []  14-year-old female with recently diagnosed atypical lobular hyperplasia of the left breast   There was also a did not diagnostic adjacent area that was biopsied  It was recommended that she have excision of both of the sites  Risks and benefits were explained  Informed consent was obtained  Patient was brought to the operating room  Operative Findings:  Targeted clips were within the specimen  Complications:   None    Procedure and Technique:  The procedure started in [facility] where the lesions were targeted  Patient was then brought to the operating room  General anesthesia was achieved  Patient was prepped and draped in the usual sterile fashion  A time-out was performed  Local anesthesia was infiltrated into the wound  A curvilinear incision was made in between both needles  Using sharp dissection this was taken through the skin subcutaneous tissue  Both wires were delivered into the wound    Flaps were now raised superiorly, medially, inferiorly, and laterally and taken around both wires  Specimen was then oriented and handed off the table  Wound was copiously irrigated  There was excellent hemostasis  Specimen mammography revealed both targeted clips were in the specimen  Local anesthesia was infiltrated into the wound and there was excellent hemostasis  Incision was approximated with a running 4-0 Monocryl suture in a subcuticular fashion  Steri-Strips and sterile dressings were applied  Patient was extubated and taken to the recovery room in stable condition having tolerated the procedure well  I was present and participated in all aspects of this procedure         I was present for the entire procedure    Patient Disposition:  PACU       SIGNATURE: Brianne Bach MD  DATE: December 28, 2021  TIME: 11:09 AM

## 2021-12-29 ENCOUNTER — TELEPHONE (OUTPATIENT)
Dept: HEMATOLOGY ONCOLOGY | Facility: CLINIC | Age: 66
End: 2021-12-29

## 2022-01-11 ENCOUNTER — TELEPHONE (OUTPATIENT)
Dept: HEMATOLOGY ONCOLOGY | Facility: CLINIC | Age: 67
End: 2022-01-11

## 2022-01-11 NOTE — TELEPHONE ENCOUNTER
Patient stated that she had a death in the family and had to leave town, so she has to cancel her post-op appointment  Call was disconnected  Surgery date was 12/28/2021 with Dr Omar Murphy  Please call patient to discuss post-op travel and R/S her post-op      [phone]

## 2022-01-11 NOTE — TELEPHONE ENCOUNTER
Patient cancelled via the [employer] see notes :    Canceled via [employer] (Dr Deandre Soriano, need to cancel Thursday, 1/13 at 11:15  Death in my family  Out of town Wednesday 1/12 - Sunday 1/16  Thank you, Deena Guillory ([phone])Reschedule?PS     I had the surgery two weeks ago and still no pathology report )

## 2022-01-17 PROBLEM — N60.92 ATYPICAL LOBULAR HYPERPLASIA (ALH) OF LEFT BREAST: Status: ACTIVE | Noted: 2021-12-28

## 2022-01-18 ENCOUNTER — OFFICE VISIT (OUTPATIENT)
Dept: SURGICAL ONCOLOGY | Facility: CLINIC | Age: 67
End: 2022-01-18
Payer: MEDICARE

## 2022-01-18 VITALS
RESPIRATION RATE: 18 BRPM | WEIGHT: 147.5 LBS | BODY MASS INDEX: 22.35 KG/M2 | TEMPERATURE: 97.8 F | HEIGHT: 68 IN | SYSTOLIC BLOOD PRESSURE: 102 MMHG | DIASTOLIC BLOOD PRESSURE: 62 MMHG | OXYGEN SATURATION: 95 % | HEART RATE: 84 BPM

## 2022-01-18 DIAGNOSIS — Z12.31 ENCOUNTER FOR SCREENING MAMMOGRAM FOR MALIGNANT NEOPLASM OF BREAST: ICD-10-CM

## 2022-01-18 DIAGNOSIS — N60.92 ATYPICAL LOBULAR HYPERPLASIA (ALH) OF LEFT BREAST: Primary | ICD-10-CM

## 2022-01-18 PROCEDURE — 1124F ACP DISCUSS-NO DSCNMKR DOCD: CPT | Performed by: SURGERY

## 2022-01-18 PROCEDURE — 99024 POSTOP FOLLOW-UP VISIT: CPT | Performed by: SURGERY

## 2022-01-18 NOTE — LETTER
January 18, 2022     Omar Colvin MD  [address] [facility],Third Floor, [address] [facility] [address]    Patient: Roselyn Becerra   YOB: 1955   Date of Visit: 1/18/2022       Dear Dr Chen Morley: Thank you for referring Tha Sykes to me for evaluation  Below are my notes for this consultation  If you have questions, please do not hesitate to call me  I look forward to following your patient along with you  Sincerely,        Tayo Ryan MD        CC: MD Tayo Pace MD  1/18/2022 12:07 PM  Incomplete               Surgical Oncology Follow Up       [address] [facility] [address] [address]  [address] [facility]  1955  [id number]  [address] [facility] [address] [id number] [phone]    Diagnoses and all orders for this visit:    Atypical lobular hyperplasia ([place]) of left breast  -     Mammo screening bilateral w 3d & cad; Future  -     Ambulatory referral to Hematology / Oncology; Future  -     Ambulatory Referral to Oncology Genetics; Future    Encounter for screening mammogram for malignant neoplasm of breast   -     Mammo screening bilateral w 3d & cad; Future        Chief Complaint   Patient presents with    Post-op       No follow-ups on file  Oncology History    No history exists  Staging: AL, bordering on [facility], December 2021  Lifetime TC risk of 30 3%  Treatment history:  Left breast lumpectomy, December 2021  Current treatment:  Hormonal therapy pending  Disease status: LY    History of Present Illness:  Patient returns in follow-up of her left lumpectomy for [place]  Final pathology revealed a [address] [facility] that is bordering on [facility]  She is doing well  She comes in now to discuss further therapy      Review of Systems  Complete ROS Surg Onc:   Complete ROS Surg Onc:   Constitutional: The patient denies new or recent history of general fatigue, no recent weight loss, no change in appetite  Eyes: No complaints of visual problems, no scleral icterus  ENT: no complaints of ear pain, no hoarseness, no difficulty swallowing,  no tinnitus and no new masses in head, oral cavity, or neck  Cardiovascular: No complaints of chest pain, no palpitations, no ankle edema  Respiratory: No complaints of shortness of breath, no cough  Gastrointestinal: No complaints of jaundice, no bloody stools, no pale stools  Genitourinary: No complaints of dysuria, no hematuria, no nocturia, no frequent urination, no urethral discharge  Musculoskeletal: No complaints of weakness, paralysis, joint stiffness or arthralgias  Integumentary: No complaints of rash, no new lesions  Neurological: No complaints of convulsions, no seizures, no dizziness  Hematologic/Lymphatic: No complaints of easy bruising  Endocrine:  No hot or cold intolerance  No polydipsia, polyphagia, or polyuria  Allergy/immunology:  No environmental allergies  No food allergies  Not immunocompromised  Skin:  No pallor or rash  No wound  Patient Active Problem List   Diagnosis    Encounter for annual routine gynecological examination    Parkinson's disease ([facility]    Family history of breast cancer    Dense breasts    Low back pain    Breast mass in female    Atypical lobular hyperplasia (ALH) of left breast     Past Medical History:   Diagnosis Date    Menopause     Parkinson disease ([facility]     PONV (postoperative nausea and vomiting)      Past Surgical History:   Procedure Laterality Date    APPENDECTOMY      BACK SURGERY  05/03/2018 2017,2018,2021    BREAST BIOPSY Right 2002    BREAST BIOPSY Left 09/22/2021    BREAST EXCISIONAL BIOPSY Right 1996    BREAST LUMPECTOMY Left 12/28/2021    Procedure: BREAST NEEDLE LOCALIZED (2 site) LUMPTECTOMY (2 SITE NEEDLE LOC AT 0930);   Surgeon: Palak Max MD;  [facility];  Service: Surgical Oncology    COLONOSCOPY      HYSTEROSCOPY W/ ENDOMETRIAL ABLATION      MAMMO NEEDLE LOCALIZATION LEFT (ALL INC) Left 2021    MAMMO STEREOTACTIC BREAST BIOPSY RIGHT (ALL INC) Right     benign    REDUCTION MAMMAPLASTY Bilateral     2007    SALPINGOOPHORECTOMY Right     US GUIDANCE BREAST BIOPSY LEFT EACH ADDITIONAL Left 2021    US GUIDED BREAST BIOPSY LEFT COMPLETE Left 2021     Family History   Problem Relation Age of Onset    Leukemia Mother     Diabetes Mother     Emphysema Father     Stroke Father     No Known Problems Sister     No Known Problems Daughter     No Known Problems Maternal Grandmother     No Known Problems Maternal Grandfather     No Known Problems Paternal Grandmother     No Known Problems Paternal Grandfather     Breast cancer Sister 54    Breast cancer Sister 54    No Known Problems Maternal Aunt     Cancer Maternal Aunt     No Known Problems Paternal Aunt     No Known Problems Paternal Aunt     No Known Problems Paternal Aunt      Social History     Socioeconomic History    Marital status: /Civil Union     Spouse name: Not on file    Number of children: Not on file    Years of education: Not on file    Highest education level: Not on file   Occupational History    Not on file   Tobacco Use    Smoking status: Former Smoker     Quit date: 1968     Years since quittin 1    Smokeless tobacco: Never Used   Vaping Use    Vaping Use: Never used   Substance and Sexual Activity    Alcohol use: Yes     Comment: SOCIAL    Drug use: Never    Sexual activity: Yes   Other Topics Concern    Not on file   Social History Narrative    Not on file     Social Determinants of Health     Financial Resource Strain: Not on file   Food Insecurity: Not on file   Transportation Needs: Not on file   Physical Activity: Not on file   Stress: Not on file   Social Connections: Not on file Intimate Partner Violence: Not on file   Housing Stability: Not on file       Current Outpatient Medications:     acetaminophen (TYLENOL) 500 mg tablet, Tylenol Extra Strength 500 mg tablet  Take 2 tablets PO HS the night before surgery, Disp: , Rfl:     CALCIUM-VITAMIN D PO, Take by mouth, Disp: , Rfl:     carbidopa-levodopa (SINEMET)  mg per tablet, TAKE 1 TABLET AT 8AM,12,4,AND 7:30 PM THEN 1/2 TABLET AT BEDTIME, Disp: 405 tablet, Rfl: 3    rasagiline (AZILECT) 1 MG, TAKE 1 TABLET BY MOUTH EVERY DAY (Patient taking differently: Take 1 mg by mouth daily TAKE 1 TABLET BY MOUTH EVERY DAY ), Disp: 90 tablet, Rfl: 1    rotigotine (Neupro) 8 MG/24HR transdermal patch, Place 1 patch on the skin daily, Disp: 90 patch, Rfl: 3    HYDROcodone-acetaminophen (Norco) 5-325 mg per tablet, Take 1 tablet by mouth every 6 (six) hours as needed for pain Max Daily Amount: 4 tablets (Patient not taking: Reported on 1/18/2022 ), Disp: 10 tablet, Rfl: 0    Multiple Vitamin (MULTI-VITAMIN DAILY) TABS, Take by mouth, Disp: , Rfl:     naproxen sodium (Aleve) 220 MG tablet, as needed , Disp: , Rfl:   Allergies   Allergen Reactions    Amoxicillin Hives    Penicillins Hives    Sulfa Antibiotics Hives     Vitals:    01/18/22 1129   BP: 102/62   Pulse: 84   Resp: 18   Temp: 97 8 °F (36 6 °C)   SpO2: 95%       Physical Exam  Constitutional: General appearance: The Patient is well-developed and well-nourished who appears the stated age in no acute distress  Patient is pleasant and talkative  HEENT:  Normocephalic  Sclerae are anicteric  Mucous membranes are moist     Extremities: There is no clubbing or cyanosis  There is no edema  Symmetric  Neuro: Grossly nonfocal  Gait is normal      Skin: Warm, anicteric  Psych:  Patient is pleasant and talkative  Breasts:  healing incision in the left breast         Pathology:  Final Diagnosis   A   Left breast (lumpectomy):       - 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)  - Biopsy site reaction present  Electronically signed by Meagan Sharma MD on 1/11/2022 at  9:30 AM   Comments: This is an appended report  These results have been appended to a previously preliminary verified report  Preliminary Diagnosis   A  Left breast (lumpectomy):       - Suggestion of multifocal in-situ lobular neoplasia, pending additional studies        - Biopsy site reaction present  Preliminary result electronically signed by Meagan Sharma MD on 1/7/2022 at 10:21 AM         Labs:      Imaging  Mammo needle localization left (all inc)    Result Date: 12/28/2021  Narrative: MAMMOGRAPHY NEEDLE LOCALIZATION Indication: Left breast high risk lesion  Localize stereotactic clips preoperatively Comparison: Prior imaging studies of the left breast Procedure: Informed consent was obtained prior to the procedure, discussing possible complications such as bleeding, infection, or allergic reaction  After verifying patient and side of interest and initialing side of concern (time out procedure), and utilizing digital mammographic guidance and sterile technique, 2 needle localization procedures of the 2 stereotactic clips in the upper outer quadrant were performed from a lateral approach  The localizing  wires was positioned adjacent to the stereotactic clips  Radiopaque skin markers was placed at the wire entry sites  The patient tolerated the procedure well, leaving the department in satisfactory condition, with  guidance images available on PACS  Impression: Impression: Successful needle localization procedure x2 of the targeted stereotactic clips  Workstation performed: [id number]     I reviewed the above laboratory and imaging data  Discussion/Summary:  65-year-old female with a lifetime risk of breast cancer that is over 30%  She now has a new diagnosis of left breast ALH, that is bordering on LCIS    At this time I have recommended that she have at least twice a year physical exam as well as alternating MRI and mammogram because of her greater than 20% lifetime risk of developing breast cancer  Because of the LCIS, I discussed that this is a marker for breast cancer with a 20% risk of developing a malignancy elsewhere  We discussed treatment options including risks reduction with hormonal therapy as well as risk reducing surgery  I will set her up with Medical Oncology to discuss hormonal therapy  We discussed a 50% risk reduction, as well as some of the associated risks with hormonal therapy given that she is postmenopausal   She will discuss this with Medical Oncology further  She is agreeable to every 6 month imaging and physical exam   I have also recommended because of her family history that she follow-up with Genetics  Her daughter is a  so she will discuss this further as well  I will see her again in 6 months with a repeat mammogram   If that imaging is stable we will plan on a MRI in the following 6 months  She is agreeable to this plan  All her questions were answered  This office visit took 20 minutes of face-to-face time the patient greater than 50% time spent counseling and coordinating care for the LCIS and elevated lifetime risk

## 2022-01-18 NOTE — PROGRESS NOTES
Surgical Oncology Follow Up       [facility]  [facility]  [address] [place] [address] [address] [facility] ELDER Rodriguez  1955  [id number]  [address] [facility]  [address]  [facility] [id number] [phone]    Diagnoses and all orders for this visit:    Atypical lobular hyperplasia [facility]) of left breast  -     Mammo screening bilateral w 3d & cad; Future  -     Ambulatory referral to Hematology / Oncology; Future  -     Ambulatory Referral to Oncology Genetics; Future    Encounter for screening mammogram for malignant neoplasm of breast   -     Mammo screening bilateral w 3d & cad; Future        Chief Complaint   Patient presents with    Post-op       No follow-ups on file  Oncology History    No history exists  Staging: ALH, bordering on IS, December 2021  Lifetime TC risk of 30 3%  Treatment history:  Left breast lumpectomy, December 2021  Current treatment:  Hormonal therapy pending  Disease status: LY    History of Present Illness:  Patient returns in follow-up of her left lumpectomy for [facility]  Final pathology revealed a [address] that is bordering on [facility]  She is doing well  She comes in now to discuss further therapy  Review of Systems  Complete ROS Surg Onc:   Complete ROS Surg Onc:   Constitutional: The patient denies new or recent history of general fatigue, no recent weight loss, no change in appetite  Eyes: No complaints of visual problems, no scleral icterus  ENT: no complaints of ear pain, no hoarseness, no difficulty swallowing,  no tinnitus and no new masses in head, oral cavity, or neck  Cardiovascular: No complaints of chest pain, no palpitations, no ankle edema  Respiratory: No complaints of shortness of breath, no cough  Gastrointestinal: No complaints of jaundice, no bloody stools, no pale stools     Genitourinary: No complaints of dysuria, no hematuria, no nocturia, no frequent urination, no urethral discharge  Musculoskeletal: No complaints of weakness, paralysis, joint stiffness or arthralgias  Integumentary: No complaints of rash, no new lesions  Neurological: No complaints of convulsions, no seizures, no dizziness  Hematologic/Lymphatic: No complaints of easy bruising  Endocrine:  No hot or cold intolerance  No polydipsia, polyphagia, or polyuria  Allergy/immunology:  No environmental allergies  No food allergies  Not immunocompromised  Skin:  No pallor or rash  No wound  Patient Active Problem List   Diagnosis    Encounter for annual routine gynecological examination    Parkinson's disease ([facility]    Family history of breast cancer    Dense breasts    Low back pain    Breast mass in female    Atypical lobular hyperplasia (ALH) of left breast     Past Medical History:   Diagnosis Date    Menopause     [facility]     PONV (postoperative nausea and vomiting)      Past Surgical History:   Procedure Laterality Date    APPENDECTOMY      BACK SURGERY  05/03/2018 2017,2018,2021    BREAST BIOPSY Right 2002    BREAST BIOPSY Left 09/22/2021    BREAST EXCISIONAL BIOPSY Right 1996    BREAST LUMPECTOMY Left 12/28/2021    Procedure: BREAST NEEDLE LOCALIZED (2 site) LUMPTECTOMY (2 SITE NEEDLE LOC AT 0930);   Surgeon: Oleg Obregon MD;  [facility];  Service: Surgical Oncology    COLONOSCOPY      HYSTEROSCOPY W/ ENDOMETRIAL ABLATION      MAMMO NEEDLE LOCALIZATION LEFT (ALL INC) Left 12/28/2021    MAMMO STEREOTACTIC BREAST BIOPSY RIGHT (ALL INC) Right     benign    REDUCTION MAMMAPLASTY Bilateral     2007    SALPINGOOPHORECTOMY Right     US GUIDANCE BREAST BIOPSY LEFT EACH ADDITIONAL Left 9/22/2021    US GUIDED BREAST BIOPSY LEFT COMPLETE Left 9/22/2021     Family History   Problem Relation Age of Onset    Leukemia Mother     Diabetes Mother     Emphysema Father    Lisandra Lopez Stroke Father     No Known Problems Sister     No Known Problems Daughter     No Known Problems Maternal Grandmother     No Known Problems Maternal Grandfather     No Known Problems Paternal Grandmother     No Known Problems Paternal Grandfather     Breast cancer Sister 54    Breast cancer Sister 54    No Known Problems Maternal Aunt     Cancer Maternal Aunt     No Known Problems Paternal Aunt     No Known Problems Paternal Aunt     No Known Problems Paternal Aunt      Social History     Socioeconomic History    Marital status: /Civil Union     Spouse name: Not on file    Number of children: Not on file    Years of education: Not on file    Highest education level: Not on file   Occupational History    Not on file   Tobacco Use    Smoking status: Former Smoker     Quit date: 1968     Years since quittin 1    Smokeless tobacco: Never Used   Vaping Use    Vaping Use: Never used   Substance and Sexual Activity    Alcohol use: Yes     Comment: SOCIAL    Drug use: Never    Sexual activity: Yes   Other Topics Concern    Not on file   Social History Narrative    Not on file     Social Determinants of Health     Financial Resource Strain: Not on file   Food Insecurity: Not on file   Transportation Needs: Not on file   Physical Activity: Not on file   Stress: Not on file   Social Connections: Not on file   Intimate Partner Violence: Not on file   Housing Stability: Not on file       Current Outpatient Medications:     acetaminophen (TYLENOL) 500 mg tablet, Tylenol Extra Strength 500 mg tablet  Take 2 tablets PO HS the night before surgery, Disp: , Rfl:     CALCIUM-VITAMIN D PO, Take by mouth, Disp: , Rfl:     carbidopa-levodopa (SINEMET)  mg per tablet, TAKE 1 TABLET AT 8AM,12,4,AND 7:30 PM THEN 1/2 TABLET AT BEDTIME, Disp: 405 tablet, Rfl: 3    rasagiline (AZILECT) 1 MG, TAKE 1 TABLET BY MOUTH EVERY DAY (Patient taking differently: Take 1 mg by mouth daily TAKE 1 TABLET BY MOUTH EVERY DAY ), Disp: 90 tablet, Rfl: 1    rotigotine (Neupro) 8 MG/24HR transdermal patch, Place 1 patch on the skin daily, Disp: 90 patch, Rfl: 3    HYDROcodone-acetaminophen (Norco) 5-325 mg per tablet, Take 1 tablet by mouth every 6 (six) hours as needed for pain Max Daily Amount: 4 tablets (Patient not taking: Reported on 1/18/2022 ), Disp: 10 tablet, Rfl: 0    Multiple Vitamin (MULTI-VITAMIN DAILY) TABS, Take by mouth, Disp: , Rfl:     naproxen sodium (Aleve) 220 MG tablet, as needed , Disp: , Rfl:   Allergies   Allergen Reactions    Amoxicillin Hives    Penicillins Hives    Sulfa Antibiotics Hives     Vitals:    01/18/22 1129   BP: 102/62   Pulse: 84   Resp: 18   Temp: 97 8 °F (36 6 °C)   SpO2: 95%       Physical Exam  Constitutional: General appearance: The Patient is well-developed and well-nourished who appears the stated age in no acute distress  Patient is pleasant and talkative  HEENT:  Normocephalic  Sclerae are anicteric  Mucous membranes are moist     Extremities: There is no clubbing or cyanosis  There is no edema  Symmetric  Neuro: Grossly nonfocal  Gait is normal      Skin: Warm, anicteric  Psych:  Patient is pleasant and talkative  Breasts:  healing incision in the left breast         Pathology:  Final Diagnosis   A  Left breast (lumpectomy):       - 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 (ALH focally bordering on LCIS)  - Biopsy site reaction present  Electronically signed by Gregory Kirby MD on 1/11/2022 at  9:30 AM   Comments: This is an appended report  These results have been appended to a previously preliminary verified report  Preliminary Diagnosis   A  Left breast (lumpectomy):       - Suggestion of multifocal in-situ lobular neoplasia, pending additional studies        - Biopsy site reaction present     Preliminary result electronically signed by Gregory Kirby MD on 1/7/2022 at 10:21 AM         Labs:      Imaging  Mammo needle localization left (all inc)    Result Date: 12/28/2021  Narrative: MAMMOGRAPHY NEEDLE LOCALIZATION Indication: Left breast high risk lesion  Localize stereotactic clips preoperatively Comparison: Prior imaging studies of the left breast Procedure: Informed consent was obtained prior to the procedure, discussing possible complications such as bleeding, infection, or allergic reaction  After verifying patient and side of interest and initialing side of concern (time out procedure), and utilizing digital mammographic guidance and sterile technique, 2 needle localization procedures of the 2 stereotactic clips in the upper outer quadrant were performed from a lateral approach  The localizing  wires was positioned adjacent to the stereotactic clips  Radiopaque skin markers was placed at the wire entry sites  The patient tolerated the procedure well, leaving the department in satisfactory condition, with  guidance images available on PACS  Impression: Impression: Successful needle localization procedure x2 of the targeted stereotactic clips  Workstation performed: [id number]     I reviewed the above laboratory and imaging data  Discussion/Summary:  62-year-old female with a lifetime risk of breast cancer that is over 30%  She now has a new diagnosis of left breast ALH, that is bordering on LCIS  At this time I have recommended that she have at least twice a year physical exam as well as alternating MRI and mammogram because of her greater than 20% lifetime risk of developing breast cancer  Because of the LCIS, I discussed that this is a marker for breast cancer with a 20% risk of developing a malignancy elsewhere  We discussed treatment options including risks reduction with hormonal therapy as well as risk reducing surgery  I will set her up with Medical Oncology to discuss hormonal therapy    We discussed a 50% risk reduction, as well as some of the associated risks with hormonal therapy given that she is postmenopausal   She will discuss this with Medical Oncology further  She is agreeable to every 6 month imaging and physical exam   I have also recommended because of her family history that she follow-up with Genetics  Her daughter is a  so she will discuss this further as well  I will see her again in 6 months with a repeat mammogram   If that imaging is stable we will plan on a MRI in the following 6 months  She is agreeable to this plan  All her questions were answered  This office visit took 20 minutes of face-to-face time the patient greater than 50% time spent counseling and coordinating care for the LCIS and elevated lifetime risk

## 2022-01-19 ENCOUNTER — TELEPHONE (OUTPATIENT)
Dept: HEMATOLOGY ONCOLOGY | Facility: CLINIC | Age: 67
End: 2022-01-19

## 2022-01-20 ENCOUNTER — TELEPHONE (OUTPATIENT)
Dept: GENETICS | Facility: CLINIC | Age: 67
End: 2022-01-20

## 2022-02-02 ENCOUNTER — TELEPHONE (OUTPATIENT)
Dept: HEMATOLOGY ONCOLOGY | Facility: CLINIC | Age: 67
End: 2022-02-02

## 2022-03-02 DIAGNOSIS — G20 PARKINSON'S DISEASE (HCC): ICD-10-CM

## 2022-03-02 RX ORDER — RASAGILINE 1 MG/1
TABLET ORAL
Qty: 90 TABLET | Refills: 1 | Status: SHIPPED | OUTPATIENT
Start: 2022-03-02

## 2022-07-22 ENCOUNTER — TELEPHONE (OUTPATIENT)
Dept: HEMATOLOGY ONCOLOGY | Facility: CLINIC | Age: 67
End: 2022-07-22

## 2022-07-22 NOTE — TELEPHONE ENCOUNTER
Scheduling Appointment     Who Is Calling to Schedule  Patient    Doctor Annette Henderson   Location [place]   Date and Time 7/28 at 1pm   Reason for scheduling appointment 6 month follow-up    Patient verbalized understanding    Yes

## 2022-07-28 ENCOUNTER — OFFICE VISIT (OUTPATIENT)
Dept: SURGICAL ONCOLOGY | Facility: CLINIC | Age: 67
End: 2022-07-28
Payer: MEDICARE

## 2022-07-28 VITALS
HEIGHT: 68 IN | SYSTOLIC BLOOD PRESSURE: 122 MMHG | OXYGEN SATURATION: 96 % | DIASTOLIC BLOOD PRESSURE: 70 MMHG | TEMPERATURE: 98.3 F | BODY MASS INDEX: 22.13 KG/M2 | HEART RATE: 90 BPM | RESPIRATION RATE: 16 BRPM | WEIGHT: 146 LBS

## 2022-07-28 DIAGNOSIS — Z91.89 INCREASED RISK OF BREAST CANCER: ICD-10-CM

## 2022-07-28 DIAGNOSIS — N60.92 ATYPICAL LOBULAR HYPERPLASIA (ALH) OF LEFT BREAST: Primary | ICD-10-CM

## 2022-07-28 DIAGNOSIS — R92.2 DENSE BREASTS: ICD-10-CM

## 2022-07-28 DIAGNOSIS — Z80.3 FAMILY HISTORY OF BREAST CANCER: ICD-10-CM

## 2022-07-28 PROCEDURE — 99213 OFFICE O/P EST LOW 20 MIN: CPT | Performed by: NURSE PRACTITIONER

## 2022-07-28 RX ORDER — ROTIGOTINE 6 MG/24H
PATCH, EXTENDED RELEASE TRANSDERMAL
COMMUNITY
Start: 2022-06-27

## 2022-07-28 RX ORDER — GABAPENTIN 300 MG/1
CAPSULE ORAL
COMMUNITY
Start: 2022-07-21

## 2022-07-28 NOTE — PROGRESS NOTES
Surgical Oncology Follow Up       [facility]  [facility] [address]    Ritika Runner  1955  [id number]      Chief Complaint   Patient presents with    Follow-up       Assessment/Plan:  1  Atypical lobular hyperplasia (ALH) of left breast  - MRI breast bilateral w and wo contrast w cad; Future    2  Increased risk of breast cancer  - Continue annual 3d mammography, annual MRI, CBE q 6 mo  - BUN; Future  - Creatinine, serum; Future  - MRI breast bilateral w and wo contrast w cad; Future    3  Family history of breast cancer  - MRI breast bilateral w and wo contrast w cad; Future  - Ambulatory Referral to Oncology Genetics; Future    4  Dense breasts    - MRI breast bilateral w and wo contrast w cad; Future      Discussion/Summary:  Patient is a 74-year-old female with a family history of breast cancer (DCIS) in 2 of her sisters and a personal history atypical lobular hyperplasia that presents today for a follow-up visit secondary to her increased risk  She underwent an excisional biopsy with Dr Benita Sevilla in December of 2021  Her surgical pathology revealed multifocal ALH, bordering on LCIS  She has declined a consultation with medical oncology to discuss chemoprevention  We have recommended screening her with annual 3D mammography, annual breast MRI and clinical breast exams every 6 months  She is scheduled for a mammogram in September  I will therefore make arrangements for her breast MRI to be performed in March of 2023 so her imaging studies are staggered every 6 months  I have also recommended clinical breast exams every 6 months  Patient will schedule her gynecology appointment in December so I will therefore plan to see her back in 1 year  We also discussed the option of genetic testing again today    I am not certain that she would meet criteria for insurance coverage as she only has 2 family members with breast cancer however patient discussed genetic testing with her daughter who is a  and wishes to proceed with testing, even if this means out-of-pocket cost   Referral placed oncology Genetics  Encouraged the patient to be self-breast aware and contact me with any changes on self-exam   All of her questions were answered today  History of Present Illness:     -Interval History:  Patient presents today for follow-up visit for an increased risk of breast cancer  She notices no changes on her self breast exam   She reports no changes in her family history  She elected not to meet with medical oncology to discuss chemoprevention secondary to her other health issues  She discussed genetic testing with her daughter and is interested in pursuing this  She is scheduled for a mammogram in September  Review of Systems:  Review of Systems   Constitutional: Negative for chills, fatigue and fever  Respiratory: Negative for cough and shortness of breath  Cardiovascular: Negative for chest pain  Neurological:        Parkinson's   Hematological: Negative for adenopathy  Psychiatric/Behavioral: Negative for confusion         Patient Active Problem List   Diagnosis    Encounter for annual routine gynecological examination    Parkinson's disease ([address] )    Family history of breast cancer    Dense breasts    Low back pain    Breast mass in female    Atypical lobular hyperplasia (ALH) of left breast    Increased risk of breast cancer     Past Medical History:   Diagnosis Date    Menopause     Parkinson disease ([address] )     PONV (postoperative nausea and vomiting)      Past Surgical History:   Procedure Laterality Date    APPENDECTOMY      BACK SURGERY  05/03/2018 2017,2018,2021    BREAST BIOPSY Right 2002    BREAST BIOPSY Left 09/22/2021    BREAST EXCISIONAL BIOPSY Right 1996    BREAST LUMPECTOMY Left 12/28/2021    Procedure: BREAST NEEDLE LOCALIZED (2 site) LUMPTECTOMY (2 SITE NEEDLE LOC AT 0930); Surgeon: Juan De Jesus MD;  [facility];  Service: Surgical Oncology    COLONOSCOPY      HYSTEROSCOPY W/ ENDOMETRIAL ABLATION      MAMMO NEEDLE LOCALIZATION LEFT (ALL INC) Left 2021    MAMMO NEEDLE LOCALIZATION LEFT (ALL INC) EACH ADD Left 2021    MAMMO STEREOTACTIC BREAST BIOPSY RIGHT (ALL INC) Right     benign    REDUCTION MAMMAPLASTY Bilateral         SALPINGOOPHORECTOMY Right     US GUIDANCE BREAST BIOPSY LEFT EACH ADDITIONAL Left 2021    US GUIDED BREAST BIOPSY LEFT COMPLETE Left 2021     Family History   Problem Relation Age of Onset    Leukemia Mother     Diabetes Mother     Emphysema Father     Stroke Father     No Known Problems Sister     No Known Problems Daughter     No Known Problems Maternal Grandmother     No Known Problems Maternal Grandfather     No Known Problems Paternal Grandmother     No Known Problems Paternal Grandfather     Breast cancer Sister 54    Breast cancer Sister 54    No Known Problems Maternal Aunt     Cancer Maternal Aunt     No Known Problems Paternal Aunt     No Known Problems Paternal Aunt     No Known Problems Paternal Aunt      Social History     Socioeconomic History    Marital status: /Civil Union     Spouse name: Not on file    Number of children: Not on file    Years of education: Not on file    Highest education level: Not on file   Occupational History    Not on file   Tobacco Use    Smoking status: Former Smoker     Quit date: 1968     Years since quittin 6    Smokeless tobacco: Never Used   Vaping Use    Vaping Use: Never used   Substance and Sexual Activity    Alcohol use: Yes     Comment: SOCIAL    Drug use: Never    Sexual activity: Yes   Other Topics Concern    Not on file   Social History Narrative    Not on file     Social Determinants of Health     Financial Resource Strain: Not on file   Food Insecurity: Not on file   Transportation Needs: Not on file   Physical Activity: Not on file   Stress: Not on file   Social Connections: Not on file   Intimate Partner Violence: Not on file   Housing Stability: Not on file       Current Outpatient Medications:     CALCIUM-VITAMIN D PO, Take by mouth, Disp: , Rfl:     carbidopa-levodopa (SINEMET)  mg per tablet, TAKE 1 TABLET AT 8AM,12,4,AND 7:30 PM THEN 1/2 TABLET AT BEDTIME (Patient taking differently: Take 1 tablet by mouth 5 (five) times a day TAKE 1 TABLET AT 8AM, 1 1/2 tablets at 12noon, 1 tab 4pm, 1 tab at 7:30 PM, 1 tab at 12midnight), Disp: 405 tablet, Rfl: 3    gabapentin (NEURONTIN) 300 mg capsule, TAKE 1 CAPSULE BY MOUTH BEDTIME  INDICATIONS: ANTICONVULSANT MEDICATION THERAPY, Disp: , Rfl:     naproxen sodium (ALEVE) 220 MG tablet, as needed , Disp: , Rfl:     Neupro 6 MG/24HR, PLACE 1 PATCH ON THE SKIN DAILY  INDICATIONS: PARKINSON'S DISEASE, Disp: , Rfl:     rasagiline (AZILECT) 1 MG, TAKE 1 TABLET BY MOUTH EVERY DAY, Disp: 90 tablet, Rfl: 1  Allergies   Allergen Reactions    Amoxicillin Hives    Penicillins Hives    Sulfa Antibiotics Hives     Vitals:    07/28/22 1306   BP: 122/70   Pulse: 90   Resp: 16   Temp: 98 3 °F (36 8 °C)   SpO2: 96%       Physical Exam  Vitals reviewed  Constitutional:       General: She is not in acute distress  Appearance: Normal appearance  She is well-developed  She is not diaphoretic  HENT:      Head: Normocephalic and atraumatic  Pulmonary:      Effort: Pulmonary effort is normal    Chest:   Breasts:      Right: Skin change (reduction scars) present  No swelling, bleeding, inverted nipple, mass, nipple discharge, tenderness, axillary adenopathy or supraclavicular adenopathy  Left: Skin change (reduction scars) present  No swelling, bleeding, inverted nipple, mass, nipple discharge, tenderness, axillary adenopathy or supraclavicular adenopathy  Comments: Left upper outer quadrant surgical scar  Abdominal:      Palpations: Abdomen is soft  There is no mass  Tenderness: There is no abdominal tenderness  Musculoskeletal:         General: Normal range of motion  Cervical back: Normal range of motion  Lymphadenopathy:      Upper Body:      Right upper body: No supraclavicular or axillary adenopathy  Left upper body: No supraclavicular or axillary adenopathy  Skin:     General: Skin is warm and dry  Findings: No rash  Neurological:      Mental Status: She is alert and oriented to person, place, and time  Motor: Tremor present  Psychiatric:         Speech: Speech normal            Advance Care Planning/Advance Directives:  Discussed disease status and treatment goals with the patient

## 2022-08-05 ENCOUNTER — TELEPHONE (OUTPATIENT)
Dept: GENETICS | Facility: CLINIC | Age: 67
End: 2022-08-05

## 2022-08-05 NOTE — TELEPHONE ENCOUNTER
I called [place] to schedule a new patient appointment with the [facility]       Outcome:   I left a voice message encouraging the patient to call my direct line to schedule this appointment  Follow-up: We will make one more attempt to reach the patient

## 2022-09-08 ENCOUNTER — HOSPITAL ENCOUNTER (OUTPATIENT)
Dept: MAMMOGRAPHY | Facility: HOSPITAL | Age: 67
Discharge: HOME | End: 2022-09-08
Attending: SURGERY
Payer: MEDICARE

## 2022-09-08 VITALS — HEIGHT: 68 IN | WEIGHT: 145.94 LBS | BODY MASS INDEX: 22.12 KG/M2

## 2022-09-08 DIAGNOSIS — N60.92 ATYPICAL LOBULAR HYPERPLASIA (ALH) OF LEFT BREAST: ICD-10-CM

## 2022-09-08 DIAGNOSIS — Z12.31 ENCOUNTER FOR SCREENING MAMMOGRAM FOR MALIGNANT NEOPLASM OF BREAST: ICD-10-CM

## 2022-09-08 PROCEDURE — 77067 SCR MAMMO BI INCL CAD: CPT

## 2022-09-08 PROCEDURE — 77063 BREAST TOMOSYNTHESIS BI: CPT

## 2022-09-12 ENCOUNTER — TELEPHONE (OUTPATIENT)
Dept: GENETICS | Facility: CLINIC | Age: 67
End: 2022-09-12

## 2022-09-12 NOTE — TELEPHONE ENCOUNTER
[place] called to schedule a new patient appointment with the [facility]       Outcome:  Genetics appointment scheduled for 12/21/2022 11am with Rowena    Personal/Family History Related to Appointment:  Family history of Breast Cancer      History of Genetic Testing:  Patient reports no personal or family history of genetic testing

## 2022-10-10 ENCOUNTER — OFFICE VISIT (OUTPATIENT)
Dept: URGENT CARE | Facility: CLINIC | Age: 67
End: 2022-10-10
Payer: MEDICARE

## 2022-10-10 VITALS — HEART RATE: 98 BPM | RESPIRATION RATE: 18 BRPM | TEMPERATURE: 98.1 F | OXYGEN SATURATION: 98 %

## 2022-10-10 DIAGNOSIS — J01.00 ACUTE MAXILLARY SINUSITIS, RECURRENCE NOT SPECIFIED: Primary | ICD-10-CM

## 2022-10-10 DIAGNOSIS — Z20.822 ENCOUNTER FOR LABORATORY TESTING FOR COVID-19 VIRUS: ICD-10-CM

## 2022-10-10 PROCEDURE — 99213 OFFICE O/P EST LOW 20 MIN: CPT | Performed by: PHYSICIAN ASSISTANT

## 2022-10-10 PROCEDURE — G0463 HOSPITAL OUTPT CLINIC VISIT: HCPCS | Performed by: PHYSICIAN ASSISTANT

## 2022-10-10 RX ORDER — AZITHROMYCIN 250 MG/1
TABLET, FILM COATED ORAL
Qty: 6 TABLET | Refills: 0 | Status: SHIPPED | OUTPATIENT
Start: 2022-10-10 | End: 2022-10-14

## 2022-10-10 NOTE — PROGRESS NOTES
[facility]        NAME: Jh Huntley is a 75 y o  female  : 1955    MRN: [id number]  DATE: October 10, 2022  TIME: 2:37 PM    Pulse 98   Temp 98 1 °F (36 7 °C)   Resp 18   LMP  (LMP Unknown)   SpO2 98%     Assessment and Plan   Acute maxillary sinusitis, recurrence not specified [J01 00]  1  Acute maxillary sinusitis, recurrence not specified  azithromycin (ZITHROMAX) 250 mg tablet   2  Encounter for laboratory testing for COVID-19 virus  azithromycin (ZITHROMAX) 250 mg tablet         Patient Instructions       Follow up with PCP in 3-5 days  Proceed to  ER if symptoms worsen  Chief Complaint   No chief complaint on file  History of Present Illness       Pt with nasal congestion sinus pain and pressure, sore throat for several days       Review of Systems   Review of Systems   Constitutional: Negative  HENT: Positive for congestion, sinus pressure, sinus pain and sore throat  Eyes: Negative  Respiratory: Positive for cough  Cardiovascular: Negative  Gastrointestinal: Negative  Endocrine: Negative  Genitourinary: Negative  Musculoskeletal: Negative  Skin: Negative  Allergic/Immunologic: Negative  Neurological: Negative  Hematological: Negative  All other systems reviewed and are negative  Current Medications       Current Outpatient Medications:   •  azithromycin (ZITHROMAX) 250 mg tablet, Take 2 tablets today then 1 tablet daily x 4 days, Disp: 6 tablet, Rfl: 0  •  CALCIUM-VITAMIN D PO, Take by mouth, Disp: , Rfl:   •  carbidopa-levodopa (SINEMET)  mg per tablet, TAKE 1 TABLET AT 8AM,12,4,AND 7:30 PM THEN 1/2 TABLET AT BEDTIME (Patient taking differently: Take 1 tablet by mouth 5 (five) times a day TAKE 1 TABLET AT 8AM, 1 1/2 tablets at 12noon, 1 tab 4pm, 1 tab at 7:30 PM, 1 tab at 12midnight), Disp: 405 tablet, Rfl: 3  •  gabapentin (NEURONTIN) 300 mg capsule, TAKE 1 CAPSULE BY MOUTH BEDTIME   INDICATIONS: ANTICONVULSANT MEDICATION THERAPY, Disp: , Rfl:   •  naproxen sodium (ALEVE) 220 MG tablet, as needed , Disp: , Rfl:   •  Neupro 6 MG/24HR, PLACE 1 PATCH ON THE SKIN DAILY  INDICATIONS: PARKINSON'S DISEASE, Disp: , Rfl:   •  rasagiline (AZILECT) 1 MG, TAKE 1 TABLET BY MOUTH EVERY DAY, Disp: 90 tablet, Rfl: 1    Current Allergies     Allergies as of 10/10/2022 - Reviewed 07/28/2022   Allergen Reaction Noted   • Amoxicillin Hives 08/14/2015   • Penicillins Hives    • Sulfa antibiotics Hives 08/14/2015            The following portions of the patient's history were reviewed and updated as appropriate: allergies, current medications, past family history, past medical history, past social history, past surgical history and problem list      Past Medical History:   Diagnosis Date   • Menopause    • Parkinson disease ([address] )    • PONV (postoperative nausea and vomiting)        Past Surgical History:   Procedure Laterality Date   • APPENDECTOMY     • BACK SURGERY  05/03/2018 2017,2018,2021   • BREAST BIOPSY Right 2002   • BREAST BIOPSY Left 09/22/2021   • BREAST EXCISIONAL BIOPSY Right 1996   • BREAST LUMPECTOMY Left 12/28/2021    Procedure: BREAST NEEDLE LOCALIZED (2 site) LUMPTECTOMY (2 SITE NEEDLE LOC AT 0930);   Surgeon: Marisol Saavedra MD;  [facility];  Service: Surgical Oncology   • COLONOSCOPY     • HYSTEROSCOPY W/ ENDOMETRIAL ABLATION     • MAMMO NEEDLE LOCALIZATION LEFT (ALL INC) Left 12/28/2021   • MAMMO NEEDLE LOCALIZATION LEFT (ALL INC) EACH ADD Left 12/28/2021   • MAMMO STEREOTACTIC BREAST BIOPSY RIGHT (ALL INC) Right     benign   • REDUCTION MAMMAPLASTY Bilateral     2007   • SALPINGOOPHORECTOMY Right    • US GUIDANCE BREAST BIOPSY LEFT EACH ADDITIONAL Left 9/22/2021   • US GUIDED BREAST BIOPSY LEFT COMPLETE Left 9/22/2021       Family History   Problem Relation Age of Onset   • Leukemia Mother 80   • Diabetes Mother    • Emphysema Father    • Stroke Father    • No Known Problems Sister    • Breast cancer Sister 54   • Breast cancer Sister 54   • No Known Problems Daughter    • No Known Problems Maternal Grandmother    • No Known Problems Maternal Grandfather    • No Known Problems Paternal Grandmother    • No Known Problems Paternal Grandfather    • No Known Problems Maternal Aunt    • Cancer Maternal Aunt 22        leukemia   • No Known Problems Paternal Aunt    • No Known Problems Paternal Aunt    • No Known Problems Paternal Aunt          Medications have been verified  Objective   Pulse 98   Temp 98 1 °F (36 7 °C)   Resp 18   LMP  (LMP Unknown)   SpO2 98%        Physical Exam     Physical Exam  Vitals and nursing note reviewed  Constitutional:       Appearance: Normal appearance  HENT:      Head: Normocephalic and atraumatic  Right Ear: Tympanic membrane, ear canal and external ear normal       Left Ear: Tympanic membrane, ear canal and external ear normal       Nose: Congestion and rhinorrhea present  Comments: Yellow nasal d/c  Max sinus tender to palp      Mouth/Throat:      Mouth: Mucous membranes are moist       Pharynx: Oropharynx is clear  Eyes:      Extraocular Movements: Extraocular movements intact  Conjunctiva/sclera: Conjunctivae normal       Pupils: Pupils are equal, round, and reactive to light  Cardiovascular:      Rate and Rhythm: Normal rate and regular rhythm  Pulses: Normal pulses  Heart sounds: Normal heart sounds  Pulmonary:      Effort: Pulmonary effort is normal       Breath sounds: Normal breath sounds  Abdominal:      General: Abdomen is flat  Bowel sounds are normal       Palpations: Abdomen is soft  Musculoskeletal:         General: Normal range of motion  Cervical back: Normal range of motion and neck supple  Skin:     General: Skin is warm  Capillary Refill: Capillary refill takes less than 2 seconds  Neurological:      General: No focal deficit present  Mental Status: She is alert and oriented to person, place, and time     Psychiatric: Mood and Affect: Mood normal          Behavior: Behavior normal

## 2022-12-12 ENCOUNTER — DOCUMENTATION (OUTPATIENT)
Dept: GENETICS | Facility: CLINIC | Age: 67
End: 2022-12-12

## 2022-12-12 NOTE — PROGRESS NOTES
Assessment/Plan:    Will continue to follow with breast care team   ASCCP guidelines reviewed and pap with cotesting noted to be up to date; this low risk patient was advised she meets criteria to d/c pap screening at age 71  No pap done today  Pt agreeable to no longer needing paps  DEXA and colonoscopy noted to be up to date  Reviewed diet/activity recommendations Calcium 1200 mg and Vit D 600-1000 IU daily  Discussed postmenopausal considerations and symptoms to report  Kegel exercises as instructed  RTO in one year for routine annual gyn exam or sooner PRN  Diagnoses and all orders for this visit:    Encounter for gynecological examination (general) (routine) without abnormal findings        Subjective:      Patient ID: Ml Hall is a 75 y o  female  This patient presents for routine annual gyn exam  Last seen 12/11/20  Patient has a hx of atypical lobular hyperplasia [facility]) of left breast with left lumptectomy 12/2021  She continues to manage with Dr Dhara Little  She is alternating mammos and MRIs  Last mammo 9/2022, stable  She is scheduled for genetic testing given family hx of 2 sisters with breast cancer  Hx of parkinsons  She denies  bleeding or spotting, VM sx, pelvic pain, dyspareunia, abnormal discharge, bowel/bladder dysfunction, depression/anx   for 40+ years, sexually active and is monogamous  Pap/HPV up to date and normal, 12/11/20  History of B/L breast reduction  Osteoporosis screening up to date and revealed osteopenia 1/5/21  Hx of bisphosphonate use  Colonoscopy, pt is unsure of exact date, but states it's due soon  The following portions of the patient's history were reviewed and updated as appropriate: allergies, current medications, past family history, past medical history, past social history, past surgical history and problem list     Review of Systems   Constitutional: Negative  Respiratory: Negative  Cardiovascular: Negative      Gastrointestinal: Negative  Endocrine: Negative  Genitourinary: Negative for dyspareunia, dysuria, frequency, pelvic pain, urgency, vaginal bleeding, vaginal discharge and vaginal pain  Skin: Negative  Neurological: Negative  Psychiatric/Behavioral: Negative  Objective:      BP 90/64   Pulse 84   Ht 5' 7 5" (1 715 m)   Wt 65 1 kg (143 lb 9 6 oz)   LMP  (LMP Unknown)   BMI 22 16 kg/m²          Physical Exam  Vitals and nursing note reviewed  Exam conducted with a chaperone present  Constitutional:       Appearance: Normal appearance  She is well-developed  HENT:      Head: Normocephalic and atraumatic  Neck:      Thyroid: No thyroid mass or thyromegaly  Cardiovascular:      Rate and Rhythm: Normal rate and regular rhythm  Heart sounds: Normal heart sounds  Pulmonary:      Effort: Pulmonary effort is normal       Breath sounds: Normal breath sounds  Chest:   Breasts:     Breasts are symmetrical       Right: No inverted nipple, mass, nipple discharge, skin change or tenderness  Left: No inverted nipple, mass, nipple discharge, skin change or tenderness  Abdominal:      General: Bowel sounds are normal       Palpations: Abdomen is soft  Tenderness: There is no abdominal tenderness  Hernia: There is no hernia in the left inguinal area or right inguinal area  Genitourinary:     General: Normal vulva  Exam position: Supine  Pubic Area: No rash  Labia:         Right: No rash, tenderness, lesion or injury  Left: No rash, tenderness, lesion or injury  Urethra: No prolapse, urethral pain, urethral swelling or urethral lesion  Vagina: Normal  No signs of injury and foreign body  No vaginal discharge, erythema, tenderness, bleeding, lesions or prolapsed vaginal walls  Cervix: No cervical motion tenderness, discharge, friability, lesion, erythema, cervical bleeding or eversion        Uterus: Not deviated, not enlarged, not fixed, not tender and no uterine prolapse  Adnexa:         Right: No mass, tenderness or fullness  Left: No mass, tenderness or fullness  Rectum: No external hemorrhoid  Comments: Urethra normal without lesions  No bladder tenderness  Musculoskeletal:         General: Normal range of motion  Cervical back: Normal range of motion and neck supple  Lymphadenopathy:      Lower Body: No right inguinal adenopathy  No left inguinal adenopathy  Skin:     General: Skin is warm and dry  Neurological:      Mental Status: She is alert and oriented to person, place, and time  Psychiatric:         Speech: Speech normal          Behavior: Behavior normal  Behavior is cooperative

## 2022-12-13 ENCOUNTER — ANNUAL EXAM (OUTPATIENT)
Dept: GYNECOLOGY | Facility: CLINIC | Age: 67
End: 2022-12-13

## 2022-12-13 VITALS
HEIGHT: 68 IN | DIASTOLIC BLOOD PRESSURE: 64 MMHG | WEIGHT: 143.6 LBS | HEART RATE: 84 BPM | BODY MASS INDEX: 21.76 KG/M2 | SYSTOLIC BLOOD PRESSURE: 90 MMHG

## 2022-12-13 DIAGNOSIS — Z01.419 ENCOUNTER FOR GYNECOLOGICAL EXAMINATION (GENERAL) (ROUTINE) WITHOUT ABNORMAL FINDINGS: Primary | ICD-10-CM

## 2022-12-14 ENCOUNTER — TELEPHONE (OUTPATIENT)
Dept: HEMATOLOGY ONCOLOGY | Facility: CLINIC | Age: 67
End: 2022-12-14

## 2022-12-21 ENCOUNTER — CLINICAL SUPPORT (OUTPATIENT)
Dept: GENETICS | Facility: CLINIC | Age: 67
End: 2022-12-21

## 2022-12-21 ENCOUNTER — DOCUMENTATION (OUTPATIENT)
Dept: GENETICS | Facility: CLINIC | Age: 67
End: 2022-12-21

## 2022-12-21 DIAGNOSIS — Z80.3 FAMILY HISTORY OF BREAST CANCER: ICD-10-CM

## 2022-12-21 DIAGNOSIS — N60.92 ATYPICAL LOBULAR HYPERPLASIA (ALH) OF LEFT BREAST: Primary | ICD-10-CM

## 2022-12-21 NOTE — LETTER
2022     Janelle Paredes, [address] [facility] [place] [id number]    Patient: Clemencia Bermudez  YOB: 1955  Date of Visit: 2022      Dear Dr Radha Telles: Thank you for referring Clemencia Bermudez to me for evaluation  Below are my notes for this consultation  If you have questions, please do not hesitate to call me  I look forward to following your patient along with you  Sincerely,        Rowena Bolden GC        CC: No Recipients        Pre-Test Genetic Counseling Consult Note    Patient Name: Clemencia Bermudez   /Age: 1955/67 y o  Referring Provider: FABIOLA Herron    Date of Service: 2022  Genetic Counselor: Samy Aaron MS, [facility]  Interpretation Services: None  [facility]      [place] was referred to the [address] [place] [address] and Genetic Assessment Program due to her personal history of atypical lobular hyperplasia and family history of breast cancer  She presents today to discuss the possibility of a hereditary cancer syndrome, options for genetic testing, and implications for her and her family  Cancer History and Treatment:     Personal History: Personal history of atypical lobular hyperplasia     Screening Hx:     Breast:     Breast Imaging: Follows with FABIOLA Lake and has annual 3d mammogram, annual breast MRI and CBE every 6 months, she declined consultation with medical oncology at this time for chemoprevention discussion,   Breast Biopsy: Excisional biopsy with Dr Diego Albright in 2021    Her surgical pathology revealed multifocal ALH, bordering on LCIS  Breast Density: Extremely dense    Colon:  Colonoscopy: Most recent 8-10 years ago, no history of colon polyps     Gynecologic:    Right SALPINGOOPHORECTOMY d/t ovarian cyst     Uterus intact but prior hx of uterine ablation    Left ovarian intact however has a hx of left ovarian cyst    Skin:  Sees derm annually    Reproductive History  Age at menarche: 14  Age at first live birth: 26  Menopause: Post Menopausal (49)  Hormone replacement: None     Medical and Surgical History  Pertinent surgical history:   Past Surgical History:   Procedure Laterality Date   • APPENDECTOMY     • BACK SURGERY  05/03/2018 2017,2018,2021   • BACK SURGERY N/A     2021 Dr Hugo Franco   • BREAST BIOPSY Right 2002   • BREAST BIOPSY Left 09/22/2021   • BREAST EXCISIONAL BIOPSY Right 1996   • BREAST LUMPECTOMY Left 12/28/2021    Procedure: BREAST NEEDLE LOCALIZED (2 site) LUMPTECTOMY (2 SITE NEEDLE LOC AT 0930); Surgeon: Laurie Perry MD;  [facility];  Service: Surgical Oncology   • COLONOSCOPY     • HYSTEROSCOPY W/ ENDOMETRIAL ABLATION     • MAMMO NEEDLE LOCALIZATION LEFT (ALL INC) Left 12/28/2021   • MAMMO NEEDLE LOCALIZATION LEFT (ALL INC) EACH ADD Left 12/28/2021   • MAMMO STEREOTACTIC BREAST BIOPSY RIGHT (ALL INC) Right     benign   • REDUCTION MAMMAPLASTY Bilateral     2007   • SALPINGOOPHORECTOMY Right    • US GUIDANCE BREAST BIOPSY LEFT EACH ADDITIONAL Left 09/22/2021   • US GUIDED BREAST BIOPSY LEFT COMPLETE Left 09/22/2021      Pertinent medical history:  Past Medical History:   Diagnosis Date   • Menopause    • Parkinson disease ([address] )    • PONV (postoperative nausea and vomiting)        Other History:  Height:   Ht Readings from Last 1 Encounters:   12/13/22 5' 7 5" (1 715 m)     Weight:   Wt Readings from Last 1 Encounters:   12/13/22 65 1 kg (143 lb 9 6 oz)     Relevant Family History   Patient reports no Ashkenazi Yazidism ancestry  Please refer to the scanned pedigree in the Media Tab for a complete family history     *All history is reported as provided by the patient; records are not available for review, except where indicated  Assessment:  We discussed sporadic, familial and hereditary cancer    We also discussed the many factors that influence our risk for cancer such as age, environmental exposures, lifestyle choices and family history  We reviewed the indications suggestive of a hereditary predisposition to cancer  Although Deena's personal and family history of cancer does not meet NCCN's current genetic testing criteria, she does have 2 sisters (FDR) who both had DCIS age ages 59 and 62  The current guideline states that one relative should be diagnosed at age 48 or under  Despite this, Deena has a personal history of [place] and 2 sisters with DCIS, one of whom who also had LCIS and hyperplasia  Given all this information it is reasonable for Deena to consider genetic testing to rule out moderately penetrant genes which have clear management recommendations  The risks, benefits, and limitations of genetic testing were reviewed with the patient, as well as genetic discrimination laws, and possible test results (positive, negative, variants of uncertain significance) and their clinical implications  If positive for a mutation, options for managing cancer risk including increased surveillance, chemoprevention, and in some cases prophylactic surgery were discussed  [place] was informed that if a hereditary cancer syndrome was identified in her, first degree relatives (parents, siblings, and children) have a chance of also inheriting the condition  Genetic testing would allow for predictive genetic testing in other relatives, who may also be at risk depending on their degree of relation  Plan: Patient decided to proceed with testing and provided consent      Summary:     Sample Collection:  The patient's blood sample was drawn in the office on 12/21/22 by medical assistant Santiago Elizabeth    Genetic Testing Preformed: [facility] Common Hereditary Cancers Panel + RNA (52 genes): APC, SPEEDY, AXIN2, BARD1, BMPR1A, BRCA1, BRCA2, BRIP1, CDH1, CDK4, CDKN2A, CHEK2, CTNNA1, DICER1, EPCAM, GREM1, HOXB13, KIT, MEN1, MLH1, MSH2, MSH3, MSH6, MUTYH, NBN, NF1, NTHL1, PALB2, PDGFRA, PMS2, POLD1, POLE, PTEN, RAD50, RAD51C, RAD51D, SDHA, SDHB, SDHC, SDHD, SMAD4, SMARCA4, STK11, TP53, TSC1, TSC2, VHL    Results take approximately 2-3 weeks to complete once test is started  We will contact [place] once results are available  Additional recommendations for surveillance/medical management will be made pending genetic test results

## 2022-12-21 NOTE — PROGRESS NOTES
Pre-Test Genetic Counseling Consult Note    Patient Name: Nery PINK/Age: 1955/67 y o  Referring Provider: FABIOLA Leon    Date of Service: 2022  Genetic Counselor: Beata Todd MS, [facility]  Interpretation Services: None  [facility]      [place] was referred to the [address] [place] [address] and Genetic Assessment Program due to her personal history of atypical lobular hyperplasia and family history of breast cancer  She presents today to discuss the possibility of a hereditary cancer syndrome, options for genetic testing, and implications for her and her family  Cancer History and Treatment:     Personal History: Personal history of atypical lobular hyperplasia     Screening Hx:     Breast:     Breast Imaging: Follows with FABIOLA Lopez and has annual 3d mammogram, annual breast MRI and CBE every 6 months, she declined consultation with medical oncology at this time for chemoprevention discussion,   Breast Biopsy: Excisional biopsy with Dr Osei Clifford in 2021    Her surgical pathology revealed multifocal ALH, bordering on LCIS  Breast Density: Extremely dense    Colon:  Colonoscopy: Most recent 8-10 years ago, no history of colon polyps     Gynecologic:    Right SALPINGOOPHORECTOMY d/t ovarian cyst     Uterus intact but prior hx of uterine ablation    Left ovarian intact however has a hx of left ovarian cyst    Skin:  Sees derm annually    Reproductive History  Age at menarche: 14  Age at first live birth: 26  Menopause: Post Menopausal (49)  Hormone replacement: None     Medical and Surgical History  Pertinent surgical history:   Past Surgical History:   Procedure Laterality Date   • APPENDECTOMY     • BACK SURGERY  2018,,   • BACK SURGERY N/A      Dr Delonte Sams   • BREAST BIOPSY Right    • BREAST BIOPSY Left 2021   • BREAST EXCISIONAL BIOPSY Right    • BREAST LUMPECTOMY Left 12/28/2021    Procedure: BREAST NEEDLE LOCALIZED (2 site) LUMPTECTOMY (2 SITE NEEDLE LOC AT 0930); Surgeon: Nacho Fernandez MD;  [facility];  Service: Surgical Oncology   • COLONOSCOPY     • HYSTEROSCOPY W/ ENDOMETRIAL ABLATION     • MAMMO NEEDLE LOCALIZATION LEFT (ALL INC) Left 12/28/2021   • MAMMO NEEDLE LOCALIZATION LEFT (ALL INC) EACH ADD Left 12/28/2021   • MAMMO STEREOTACTIC BREAST BIOPSY RIGHT (ALL INC) Right     benign   • REDUCTION MAMMAPLASTY Bilateral     2007   • SALPINGOOPHORECTOMY Right    • US GUIDANCE BREAST BIOPSY LEFT EACH ADDITIONAL Left 09/22/2021   • US GUIDED BREAST BIOPSY LEFT COMPLETE Left 09/22/2021      Pertinent medical history:  Past Medical History:   Diagnosis Date   • Menopause    • Parkinson disease ([address] )    • PONV (postoperative nausea and vomiting)        Other History:  Height:   Ht Readings from Last 1 Encounters:   12/13/22 5' 7 5" (1 715 m)     Weight:   Wt Readings from Last 1 Encounters:   12/13/22 65 1 kg (143 lb 9 6 oz)     Relevant Family History   Patient reports no Ashkenazi Protestant ancestry  Please refer to the scanned pedigree in the Media Tab for a complete family history     *All history is reported as provided by the patient; records are not available for review, except where indicated  Assessment:  We discussed sporadic, familial and hereditary cancer  We also discussed the many factors that influence our risk for cancer such as age, environmental exposures, lifestyle choices and family history  We reviewed the indications suggestive of a hereditary predisposition to cancer  Although Deena's personal and family history of cancer does not meet NCCN's current genetic testing criteria, she does have 2 sisters (FDR) who both had DCIS age ages 59 and 62  The current guideline states that one relative should be diagnosed at age 48 or under    Despite this, Deena has a personal history of [place] and 2 sisters with DCIS, one of whom who also had LCIS and hyperplasia  Given all this information it is reasonable for Deena to consider genetic testing to rule out moderately penetrant genes which have clear management recommendations  The risks, benefits, and limitations of genetic testing were reviewed with the patient, as well as genetic discrimination laws, and possible test results (positive, negative, variants of uncertain significance) and their clinical implications  If positive for a mutation, options for managing cancer risk including increased surveillance, chemoprevention, and in some cases prophylactic surgery were discussed  [place] was informed that if a hereditary cancer syndrome was identified in her, first degree relatives (parents, siblings, and children) have a chance of also inheriting the condition  Genetic testing would allow for predictive genetic testing in other relatives, who may also be at risk depending on their degree of relation  Plan: Patient decided to proceed with testing and provided consent  Summary:     Sample Collection:  The patient's blood sample was drawn in the office on 12/21/22 by medical assistant Francisco Ovalle    Genetic Testing Preformed: [facility] Common Hereditary Cancers Panel + RNA (52 genes): APC, SPEEDY, AXIN2, BARD1, BMPR1A, BRCA1, BRCA2, BRIP1, CDH1, CDK4, CDKN2A, CHEK2, CTNNA1, DICER1, EPCAM, GREM1, HOXB13, KIT, MEN1, MLH1, MSH2, MSH3, MSH6, MUTYH, NBN, NF1, NTHL1, PALB2, PDGFRA, PMS2, POLD1, POLE, PTEN, RAD50, RAD51C, RAD51D, SDHA, SDHB, SDHC, SDHD, SMAD4, SMARCA4, STK11, TP53, TSC1, TSC2, VHL    Results take approximately 2-3 weeks to complete once test is started  We will contact [place] once results are available  Additional recommendations for surveillance/medical management will be made pending genetic test results

## 2022-12-29 NOTE — TELEPHONE ENCOUNTER
Patient asking for new script for Neupro patch 8mg  States her previous provider was prescribing and she was told to continue this at today's visit  Please review and sign, thanks! No

## 2023-01-10 ENCOUNTER — TELEPHONE (OUTPATIENT)
Dept: GENETICS | Facility: CLINIC | Age: 68
End: 2023-01-10

## 2023-01-10 NOTE — TELEPHONE ENCOUNTER
Post-Test Genetic Counseling Consult Note  Today I left a voicemail for sierra reviewing the results of her genetic test for hereditary cancer  She met previously with Lawyer Pineda on 12/21 for pre-test counseling  I encouraged her to call [phone] to discuss this result further  A copy of this consult note and genetic test result will be shared with the patient  SUMMARY:    Test(s): [facility] Core Cancer Panel + RNA (36 genes): APC, SPEEDY, AXIN2 BARD1, BRCA1, BRCA2, BRIP1, BMPR1A, CDH1, CDK4, CDKN2A, CHEK2, DICER1, EPCAM, GREM1, HOXB13, MLH1, MSH2, MSH3, MSH6, MUTYH, NBN, NF1, NTHL1, PALB2, PMS2, POLD1, POLE, PTEN, RAD51C, RAD51D, RECQL SMAD4, SMARCA4, STK11, TP53    Result: Negative - No Clinically Significant Variants Detected      Assessment:   A negative result significantly reduces the likelihood that [place] has a hereditary cancer syndrome  However, this testing is unable to completely rule out the presence of hereditary cancer  It remains possible that:  - There is a variant in an area of a gene which was not tested or there is a variant not detectable due to technical limitations of this test      - There is a variant in another gene that was not included in this test or in a gene not known to be linked to cancer or tumors  - A family member has a genetic variant that the patient did not inherit  - The cancer in the family is sporadic and is related to non-hereditary factors  Risk based on Family History:    I reviewed that Sierra's risk for breast cancer is elevated based on her personal history of ALH and family history of breast cancer  She is already following with FABIOLA Vogel for appropriate breast cancer screening based on this risk (annual 3d mammogram, annual breast MRI and clinical breast exam every 6 months)  We recommend she continue to follow with Griselda Fulton based on this elevated risk      Risks and Testing for Family Members:    Despite a negative result, Alec first-degree relatives may be at increased risk for the cancers based on the family history  We recommend they discuss screening and management recommendations with their healthcare providers  At this time we do not recommend testing for [place] 's children based on her negative test result  [place] 's children still need to consider the history of cancer on the other side of their family when determining their risks  If [place] has any affected family members with a cancer diagnosis, especially at a young age, they may still consider genetic testing  Relatives who wish to pursue genetic testing can reach out to the [facility] [address] ([phone]) to schedule an appointment or visit www [facility] org to identify a local genetic counselor  Additional Information:  A healthy lifestyle will improve overall health and reduce risk for illness  Eating a healthy diet and exercising for 4 hours per week is recommended  Both diet and exercise have been shown to help maintain a healthy weight  Postmenopausal women who are overweight are at higher risk for breast cancer  Moderate to heavy alcohol use can increase the risk for some cancers  Smoking cigarettes can also increase risk for breast, lung, prostate, pancreatic and other cancers  Plan:   There are no additional recommendations based on [place]'s negative result  she should continue cancer screening and medical management as clinically indicated and as determined appropriate by her healthcare providers  Negative Result: [place] was strongly encouraged to contact us regarding any changes in her personal or family history of cancer as these changes could alter our recommendation regarding genetic testing and/or cancer screening

## 2023-01-11 ENCOUNTER — TELEPHONE (OUTPATIENT)
Dept: GENETICS | Facility: CLINIC | Age: 68
End: 2023-01-11

## 2023-01-11 NOTE — TELEPHONE ENCOUNTER
----- Message from Vinicius Avila sent at 1/10/2023  4:05 PM EST -----  Regarding: complete  GC Completed Chart     Result Delivery: Patient requested [employer] message of lab result/result note    Monthly Review: Does not need monthly review- COMPLETE

## 2023-02-03 ENCOUNTER — OFFICE VISIT (OUTPATIENT)
Dept: PLASTIC SURGERY | Facility: HOSPITAL | Age: 68
End: 2023-02-03

## 2023-02-03 VITALS
HEIGHT: 68 IN | DIASTOLIC BLOOD PRESSURE: 78 MMHG | WEIGHT: 141 LBS | RESPIRATION RATE: 16 BRPM | TEMPERATURE: 98.1 F | HEART RATE: 78 BPM | SYSTOLIC BLOOD PRESSURE: 122 MMHG | BODY MASS INDEX: 21.37 KG/M2

## 2023-02-03 DIAGNOSIS — S09.93XD FACIAL INJURY, SUBSEQUENT ENCOUNTER: Primary | ICD-10-CM

## 2023-02-03 NOTE — PROGRESS NOTES
Assessment/Plan: Please see HPI  Examination reveals no evidence of entrapment, or limitation/restriction in mandibular excursion  There is a slightly palpable step-off at the junction of the mid and lateral portions of the left zygomatic arch  there are no clinical indications that operative repair is indicated for these fractures  However, prior to making a definitive determination the films will need to be reviewed by me personally  They are aware of this  They are leaving tomorrow for a trip to [place] for a week, I have asked them to contact our medical assistant SS on Monday, hopefully the images will be made available for our review by that time  There are no diagnoses linked to this encounter  Subjective: Left periorbital trauma     Patient ID: Joon Wood is a 75 y o  female  HPI Hussain Bolivar presents for evaluation, status post fall from standing 8 days ago at which time she struck her left cheek/periorbital region on a carpeted floor at home  She presented to [facility], facial CT scan was performed, the images are not available for review by us today  CT findings were #1 no acute posttraumatic intracranial abnormality or depressed calvarial fracture #2 acute fracture of the posterolateral wall of the left maxillary sinus extending to the maxillary alveolus #3 acute fracture of left zygomatic arch with angulation deformity #4 fracture of left orbital floor and nondisplaced fracture of the lateral wall of the left orbit    The following portions of the patient's history were reviewed and updated as appropriate: allergies, current medications, past family history, past medical history, past social history, past surgical history and problem list     Review of Systems   Constitutional: Negative for chills and fever  HENT: Negative for hearing loss  Eyes: Positive for visual disturbance  Negative for discharge     Respiratory: Negative for chest tightness and shortness of breath  Cardiovascular: Negative for chest pain and leg swelling  Gastrointestinal: Negative for blood in stool, constipation, diarrhea and nausea  Genitourinary: Negative for dysuria  Musculoskeletal: Negative for gait problem  Skin: Negative for rash  Allergic/Immunologic: Negative for immunocompromised state  Neurological: Negative for seizures and headaches  Hematological: Does not bruise/bleed easily  Psychiatric/Behavioral: Negative for dysphoric mood  The patient is not nervous/anxious  Objective:      /78 (BP Location: Right arm, Patient Position: Sitting, Cuff Size: Standard)   Pulse 78   Temp 98 1 °F (36 7 °C) (Tympanic)   Resp 16   Ht 5' 7 5" (1 715 m)   Wt 64 kg (141 lb)   LMP  (LMP Unknown)   BMI 21 76 kg/m²          Physical Exam  HENT:      Head: Normocephalic  Comments: Left periorbital edema, infraorbital ecchymosis, EOMI, no evidence of ocular entrapment, mandibular excursion within normal limits  Eyes:      Pupils: Pupils are equal, round, and reactive to light  Cardiovascular:      Rate and Rhythm: Normal rate  Pulmonary:      Effort: Pulmonary effort is normal    Abdominal:      Palpations: Abdomen is soft  Musculoskeletal:         General: Normal range of motion  Cervical back: Normal range of motion  Skin:     General: Skin is warm  Neurological:      Mental Status: She is alert and oriented to person, place, and time

## 2023-02-10 ENCOUNTER — TELEPHONE (OUTPATIENT)
Dept: PLASTIC SURGERY | Facility: CLINIC | Age: 68
End: 2023-02-10

## 2023-02-10 NOTE — TELEPHONE ENCOUNTER
Patient lvm asking if we received imaging from her CAT scan  Called patient and she stated she saw Dr Chucky Lawrence last week and then left for vacation and she was following up if we had received the CAT scan images  Informed patient that we did not and asked that she get a disc printed at [facility] radiology of the CAT scan and bring it to us and we can upload the images  Patient verbalized understanding

## 2023-03-21 ENCOUNTER — OFFICE VISIT (OUTPATIENT)
Dept: CARDIOLOGY CLINIC | Facility: CLINIC | Age: 68
End: 2023-03-21

## 2023-03-21 VITALS
WEIGHT: 143.2 LBS | HEIGHT: 68 IN | HEART RATE: 87 BPM | BODY MASS INDEX: 21.7 KG/M2 | DIASTOLIC BLOOD PRESSURE: 60 MMHG | SYSTOLIC BLOOD PRESSURE: 84 MMHG

## 2023-03-21 DIAGNOSIS — G90.9 AUTONOMIC DYSFUNCTION: ICD-10-CM

## 2023-03-21 DIAGNOSIS — R00.2 INTERMITTENT PALPITATIONS: ICD-10-CM

## 2023-03-21 DIAGNOSIS — G20 PARKINSON'S DISEASE (HCC): ICD-10-CM

## 2023-03-21 DIAGNOSIS — I95.1 ORTHOSTATIC SYNCOPE: Primary | ICD-10-CM

## 2023-03-21 RX ORDER — FLUDROCORTISONE ACETATE 0.1 MG/1
0.1 TABLET ORAL DAILY
COMMUNITY
Start: 2023-03-10

## 2023-03-21 NOTE — PROGRESS NOTES
Cardiology Consultation     Usman Ser  [id number]  1955  [facility] [address] [address] [phone]    1  Orthostatic syncope  POCT ECG    AMB extended holter monitor      2  Parkinson's disease ([address] )        3  Autonomic dysfunction        4  Intermittent palpitations  AMB extended holter monitor          Discussion/Summary:    1  Orthostatic syncope - Deena's events are consistent with this as they occurred with with standing up and given her Parkinson's disease she certainly is at risk for autonomic dysfunction/Shy-Drager's  Orthostatics are positive today  She was started on Florinef  We went over lifestyle modifications which included compression stockings, keeping well-hydrated along with liberalizing salt  I also gave her some lifestyle changes to slow getting up too quickly from a lying or seated position  She is going to continue to work on all of this  If needed we are neurology can add midodrine  2   Intermittent palpitations - Given her syncope and this I did order a 2-week extended ambulatory Holter  If this is unremarkable she only needs to see us back if needed as neurology can manage her autonomic dysfunction  HPI:    Mrs Nel Clement comes in for consultation given a couple episodes of syncope that she experienced earlier this year  She also had an episode last summer  She describes classic orthostatic hypotension and she does have a history of Parkinson's disease, currently on treatment and follows with neurology  She also tells me that her blood pressure normally runs low  She has no prior cardiac history nor any other risk factors for cardiovascular disease  Last summer she had an episode of presyncope and near syncope after getting out of a hot tub  Earlier this year Deena had 2 episodes of syncope after standing up    One occurred at home and the other 1 occurred while visiting her daughter  Both of them were very consistent with orthostatic hypotension  Her first episode caused her to hit her face and fracture her orbit  She went to [facility] and work-up from a cardiac standpoint was unremarkable  She had a normal echocardiogram, carotid duplex and her ECGs were normal as well  She saw neurology in follow-up who recommended Florinef which she started recently  She still has some periodic lightheadedness but has not had any return of syncope  She is orthostatic today with blood pressure checks  Deena does feel anxious about these episodes, and worries if it is going to happen again where she may hurt herself worse  It is certainly understandable  She does get some intermittent palpitations and chest tightness, which is random  She notices that her heart rate does run high at times  She denies any signs or symptoms of CHF  She denies any other symptoms suggestive of angina  Patient Active Problem List   Diagnosis   • Encounter for annual routine gynecological examination   • Parkinson's disease ([facility] [address] )   • Family history of breast cancer   • Dense breasts   • Low back pain   • Breast mass in female   • Atypical lobular hyperplasia (ALH) of left breast   • Increased risk of breast cancer     Past Medical History:   Diagnosis Date   • Menopause    • Parkinson disease ([facility] [address] )    • PONV (postoperative nausea and vomiting)      Social History     Socioeconomic History   • Marital status: /Civil Union     Spouse name: Not on file   • Number of children: Not on file   • Years of education: Not on file   • Highest education level: Not on file   Occupational History   • Not on file   Tobacco Use   • Smoking status: Former     Types: Cigarettes     Quit date: 1968     Years since quittin 2   • Smokeless tobacco: Never   Vaping Use   • Vaping Use: Never used   Substance and Sexual Activity   • Alcohol use:  Yes Comment: SOCIAL   • Drug use: Never   • Sexual activity: Yes     Birth control/protection: Post-menopausal   Other Topics Concern   • Not on file   Social History Narrative   • Not on file     Social Determinants of Health     Financial Resource Strain: Not on file   Food Insecurity: Not on file   Transportation Needs: Not on file   Physical Activity: Not on file   Stress: Not on file   Social Connections: Not on file   Intimate Partner Violence: Not on file   Housing Stability: Not on file      Family History   Problem Relation Age of Onset   • Leukemia Mother 80   • Diabetes Mother    • Emphysema Father    • Stroke Father    • No Known Problems Sister    • Breast cancer Sister 54   • Breast cancer Sister 54   • No Known Problems Daughter    • No Known Problems Maternal Grandmother    • No Known Problems Maternal Grandfather    • No Known Problems Paternal Grandmother    • No Known Problems Paternal Grandfather    • No Known Problems Maternal Aunt    • Cancer Maternal Aunt 22        leukemia   • No Known Problems Paternal Aunt    • No Known Problems Paternal Aunt    • No Known Problems Paternal Aunt      Past Surgical History:   Procedure Laterality Date   • APPENDECTOMY     • BACK SURGERY  05/03/2018 2017,2018,2021   • BACK SURGERY N/A     2021 Dr Luis Antoine   • BREAST BIOPSY Right 2002   • BREAST BIOPSY Left 09/22/2021   • BREAST EXCISIONAL BIOPSY Right 1996   • BREAST LUMPECTOMY Left 12/28/2021    Procedure: BREAST NEEDLE LOCALIZED (2 site) LUMPTECTOMY (2 SITE NEEDLE LOC AT 0930);   Surgeon: Leonel Prescott MD;  [facility];  Service: Surgical Oncology   • COLONOSCOPY     • HYSTEROSCOPY W/ ENDOMETRIAL ABLATION     • MAMMO NEEDLE LOCALIZATION LEFT (ALL INC) Left 12/28/2021   • MAMMO NEEDLE LOCALIZATION LEFT (ALL INC) EACH ADD Left 12/28/2021   • MAMMO STEREOTACTIC BREAST BIOPSY RIGHT (ALL INC) Right     benign   • REDUCTION MAMMAPLASTY Bilateral     2007   • SALPINGOOPHORECTOMY Right    • US GUIDANCE BREAST BIOPSY LEFT EACH ADDITIONAL Left 09/22/2021   • US GUIDED BREAST BIOPSY LEFT COMPLETE Left 09/22/2021       Current Outpatient Medications:   •  CALCIUM-VITAMIN D PO, Take by mouth, Disp: , Rfl:   •  carbidopa-levodopa (SINEMET)  mg per tablet, TAKE 1 TABLET AT 8AM,12,4,AND 7:30 PM THEN 1/2 TABLET AT BEDTIME (Patient taking differently: Take 1 tablet by mouth 5 (five) times a day TAKE 1 TABLET AT 8AM, 1 1/2 tablets at 12noon, 1 tab 4pm, 1 tab at 7:30 PM, 1 tab at 12midnight), Disp: 405 tablet, Rfl: 3  •  fludrocortisone (FLORINEF) 0 1 mg tablet, Take 0 1 mg by mouth daily, Disp: , Rfl:   •  naproxen sodium (ALEVE) 220 MG tablet, as needed , Disp: , Rfl:   •  Neupro 6 MG/24HR, PLACE 1 PATCH ON THE SKIN DAILY  INDICATIONS: PARKINSON'S DISEASE, Disp: , Rfl:   •  rasagiline (AZILECT) 1 MG, TAKE 1 TABLET BY MOUTH EVERY DAY, Disp: 90 tablet, Rfl: 1  Allergies   Allergen Reactions   • Amoxicillin Hives   • Penicillins Hives   • Sulfa Antibiotics Hives     Vitals:    03/21/23 1351 03/21/23 1402 03/21/23 1403   BP: 112/72 92/62 (!) 84/60   BP Location: Left arm Left arm Left arm   Patient Position: Supine Sitting Sitting   Cuff Size: Standard Standard Standard   Pulse: 68 80 87   Weight: 65 kg (143 lb 3 2 oz)     Height: 5' 8" (1 727 m)         Labs:  Lab Results   Component Value Date     01/16/2014    K 3 5 12/03/2021    K 4 4 01/16/2014     12/03/2021     01/16/2014    CO2 28 12/03/2021    CO2 31 01/16/2014    BUN 22 12/03/2021    BUN 22 01/16/2014    CREATININE 0 74 12/03/2021    CREATININE 0 83 01/16/2014    GLUCOSE 138 01/16/2014    CALCIUM 9 2 12/03/2021    CALCIUM 9 8 01/16/2014     Lab Results   Component Value Date    WBC 6 40 12/03/2021    HGB 13 2 12/03/2021    HCT 40 7 12/03/2021     (H) 12/03/2021     12/03/2021     Imaging: ECG obtained demonstrates sinus rhythm and is within normal limits  Review of Systems:  Review of Systems   Constitutional: Negative      HENT: Negative  Eyes: Negative  Respiratory: Positive for chest tightness  Cardiovascular: Positive for palpitations  Gastrointestinal: Negative  Musculoskeletal: Negative  Skin: Negative  Allergic/Immunologic: Negative  Neurological: Positive for tremors, syncope and light-headedness  Hematological: Negative  Psychiatric/Behavioral: The patient is nervous/anxious  All other systems reviewed and are negative  Vitals:    03/21/23 1351 03/21/23 1402 03/21/23 1403   BP: 112/72 92/62 (!) 84/60   BP Location: Left arm Left arm Left arm   Patient Position: Supine Sitting Sitting   Cuff Size: Standard Standard Standard   Pulse: 68 80 87   Weight: 65 kg (143 lb 3 2 oz)     Height: 5' 8" (1 727 m)         Physical Exam  Vitals and nursing note reviewed  Constitutional:       Appearance: She is well-developed  HENT:      Head: Normocephalic and atraumatic  Eyes:      General: No scleral icterus  Right eye: No discharge  Left eye: No discharge  Pupils: Pupils are equal, round, and reactive to light  Neck:      Thyroid: No thyromegaly  Vascular: No JVD  Cardiovascular:      Rate and Rhythm: Normal rate and regular rhythm  No extrasystoles are present  Pulses: Normal pulses  No decreased pulses  Heart sounds: Normal heart sounds, S1 normal and S2 normal  No murmur heard  No friction rub  No gallop  Pulmonary:      Effort: Pulmonary effort is normal  No respiratory distress  Breath sounds: Normal breath sounds  No wheezing or rales  Abdominal:      General: Bowel sounds are normal  There is no distension  Palpations: Abdomen is soft  Tenderness: There is no abdominal tenderness  Musculoskeletal:         General: No tenderness or deformity  Normal range of motion  Cervical back: Normal range of motion and neck supple  Skin:     General: Skin is warm and dry  Findings: No rash     Neurological:      Mental Status: She is alert and oriented to person, place, and time  Cranial Nerves: No cranial nerve deficit  Psychiatric:         Thought Content: Thought content normal          Judgment: Judgment normal        Counseling / Coordination of Care  Total office time spent today 40 minutes  Greater than 50% of total time was spent with the patient and / or family counseling and / or coordination of care

## 2023-03-22 ENCOUNTER — TELEPHONE (OUTPATIENT)
Dept: CARDIOLOGY CLINIC | Facility: CLINIC | Age: 68
End: 2023-03-22

## 2023-03-22 ENCOUNTER — TELEPHONE (OUTPATIENT)
Dept: SURGICAL ONCOLOGY | Facility: CLINIC | Age: 68
End: 2023-03-22

## 2023-03-22 ENCOUNTER — HOSPITAL ENCOUNTER (OUTPATIENT)
Dept: MRI IMAGING | Facility: HOSPITAL | Age: 68
Discharge: HOME | End: 2023-03-22

## 2023-03-22 DIAGNOSIS — R92.8 ABNORMAL MRI, BREAST: Primary | ICD-10-CM

## 2023-03-22 DIAGNOSIS — N60.92 ATYPICAL LOBULAR HYPERPLASIA (ALH) OF LEFT BREAST: ICD-10-CM

## 2023-03-22 DIAGNOSIS — Z80.3 FAMILY HISTORY OF BREAST CANCER: ICD-10-CM

## 2023-03-22 DIAGNOSIS — R92.2 DENSE BREASTS: ICD-10-CM

## 2023-03-22 DIAGNOSIS — Z91.89 INCREASED RISK OF BREAST CANCER: ICD-10-CM

## 2023-03-22 RX ADMIN — GADOBUTROL 6 ML: 604.72 INJECTION INTRAVENOUS at 11:42

## 2023-03-22 NOTE — TELEPHONE ENCOUNTER
Spoke with patient and reviewed results of recent breast MRI which revealed non mass enhancement of the left breast  MRI guided biopsy was recommended  Patient agreeable; order placed  I will call the patient with the results

## 2023-03-22 NOTE — TELEPHONE ENCOUNTER
No Authorization Required for ([id number]) and ([id number]) per Sarah Santos (Medicare) on 3/22/2023  #[id number]      No Authorization Requied  ([id number]) ([id number])  Per Back of [employer] Supplement card/ justina

## 2023-04-24 ENCOUNTER — TELEPHONE (OUTPATIENT)
Dept: CARDIOLOGY CLINIC | Facility: CLINIC | Age: 68
End: 2023-04-24

## 2023-04-24 NOTE — TELEPHONE ENCOUNTER
Hi, my name is Julio Cesar Pineda and my phone number is [phone]  Doctor Denise Dillard prescribed me to wear a heart monitor which I did for almost 2 weeks and then left the country on a vacation trip  I I'm trying to get results from that  I checked the Evaline Kinnier chart and it's not showing me any results although there is a reference to it  So I'm calling for those results and if you could please give me a call back I would appreciate it  Thank you      Pt is calling for results of nita, uploaded     Please advise

## 2023-05-25 NOTE — PROGRESS NOTES
__left___ breast MRI guided biopsy scheduled for __5/26/23____ @ _0800_____ ( __0730____ arrival)         Questions and concerns were addressed at this time  Patient verbalized understanding  Allergies & medications reviewed with pt & verified in Epic   pre-procedure instructions reviewed with pt      Implants:  breast/surgical/medical  _x__ NO  ___ YES (type: _____ )       Blood thinners:   _x__ NO  ___ YES/medication: ___ (no need to stop per RBC protocol)          -INR ____ (if pt on Coumadin/Warfarin)

## 2023-05-26 ENCOUNTER — HOSPITAL ENCOUNTER (OUTPATIENT)
Dept: RADIOLOGY | Facility: HOSPITAL | Age: 68
Discharge: HOME | End: 2023-05-26

## 2023-05-26 DIAGNOSIS — R92.8 ABNORMAL MRI, BREAST: ICD-10-CM

## 2023-05-26 RX ORDER — LIDOCAINE HYDROCHLORIDE 10 MG/ML
5 INJECTION, SOLUTION EPIDURAL; INFILTRATION; INTRACAUDAL; PERINEURAL ONCE
Status: COMPLETED | OUTPATIENT
Start: 2023-05-26 | End: 2023-05-26

## 2023-05-26 RX ORDER — LIDOCAINE HYDROCHLORIDE AND EPINEPHRINE BITARTRATE 20; .01 MG/ML; MG/ML
20 INJECTION, SOLUTION SUBCUTANEOUS ONCE
Status: COMPLETED | OUTPATIENT
Start: 2023-05-26 | End: 2023-05-26

## 2023-05-26 RX ADMIN — LIDOCAINE HYDROCHLORIDE,EPINEPHRINE BITARTRATE 20 ML: 20; .01 INJECTION, SOLUTION INFILTRATION; PERINEURAL at 08:25

## 2023-05-26 RX ADMIN — LIDOCAINE HYDROCHLORIDE 5 ML: 10 INJECTION, SOLUTION EPIDURAL; INFILTRATION; INTRACAUDAL; PERINEURAL at 08:24

## 2023-05-26 RX ADMIN — GADOBUTROL 7 ML: 604.72 INJECTION INTRAVENOUS at 08:29

## 2023-05-26 NOTE — NURSING NOTE
Patient left breast tolerated biopsy well, no complaints verbalized  After holding pressure to site, steri-strips applied and band aide  IV removed from left [facility] site  Post procedure discharge instructions and ice packs given to patient with verbal communication of understanding  Patient left MRI suite ambulating with no complaints

## 2023-06-29 ENCOUNTER — OFFICE VISIT (OUTPATIENT)
Dept: URGENT CARE | Facility: CLINIC | Age: 68
End: 2023-06-29
Payer: MEDICARE

## 2023-06-29 VITALS
DIASTOLIC BLOOD PRESSURE: 55 MMHG | HEART RATE: 90 BPM | OXYGEN SATURATION: 97 % | TEMPERATURE: 98.2 F | SYSTOLIC BLOOD PRESSURE: 96 MMHG | RESPIRATION RATE: 18 BRPM

## 2023-06-29 DIAGNOSIS — R05.1 ACUTE COUGH: Primary | ICD-10-CM

## 2023-06-29 PROCEDURE — 99213 OFFICE O/P EST LOW 20 MIN: CPT | Performed by: PHYSICIAN ASSISTANT

## 2023-06-29 PROCEDURE — G0463 HOSPITAL OUTPT CLINIC VISIT: HCPCS | Performed by: PHYSICIAN ASSISTANT

## 2023-06-29 RX ORDER — SENNA PLUS 8.6 MG/1
8.6 TABLET ORAL DAILY
COMMUNITY
Start: 2023-01-27 | End: 2024-01-27

## 2023-06-29 RX ORDER — ROTIGOTINE 4 MG/24H
PATCH, EXTENDED RELEASE TRANSDERMAL
COMMUNITY
Start: 2023-03-08

## 2023-06-29 RX ORDER — SODIUM CHLORIDE 1 G/1
1 TABLET ORAL 3 TIMES DAILY
COMMUNITY
Start: 2023-06-24

## 2023-06-29 RX ORDER — FLUDROCORTISONE ACETATE 0.1 MG/1
1 TABLET ORAL DAILY
COMMUNITY
Start: 2023-03-10

## 2023-06-29 RX ORDER — AZITHROMYCIN 250 MG/1
TABLET, FILM COATED ORAL
Qty: 6 TABLET | Refills: 0 | Status: SHIPPED | OUTPATIENT
Start: 2023-06-29 | End: 2023-07-03

## 2023-06-29 NOTE — PROGRESS NOTES
[address] [facility]        NAME: Remy Hi is a 70 y o  female  : 1955    MRN: [id number]  DATE: 2023  TIME: 11:49 AM    Assessment and Plan   Acute cough [R05 1]  1  Acute cough  azithromycin (ZITHROMAX) 250 mg tablet            Patient Instructions       Patient was educated on acute cough  Patient was prescribed antibiotics  Patient was told to eat on antibiotics  Any chest pain or shortness of breath go to ED  IF symptoms persist follow up with PCP    Chief Complaint     Chief Complaint   Patient presents with   • Cold Like Symptoms     Pt reports cold symptoms since Fri, home COVID test was negative         History of Present Illness       Patient is here today is complaining of decreased voice , PND, wet cough and fatigue since 22  Denies any history of asthma or diabetes  Patient took an at [facility] test that was negative  Admits allergy to Pencillin, sulfa and Amoxicillin  Review of Systems   Review of Systems   Constitutional: Negative  HENT: Positive for congestion and postnasal drip  Respiratory: Positive for cough  Cardiovascular: Negative  Psychiatric/Behavioral: Negative            Current Medications       Current Outpatient Medications:   •  azithromycin (ZITHROMAX) 250 mg tablet, Take 2 tablets today then 1 tablet daily x 4 days, Disp: 6 tablet, Rfl: 0  •  fludrocortisone (FLORINEF) 0 1 mg tablet, Take 1 tablet by mouth daily, Disp: , Rfl:   •  rotigotine (Neupro) 4 MG/24HR, , Disp: , Rfl:   •  CALCIUM-VITAMIN D PO, Take by mouth, Disp: , Rfl:   •  carbidopa-levodopa (SINEMET)  mg per tablet, TAKE 1 TABLET AT 8AM,12,4,AND 7:30 PM THEN 1/2 TABLET AT BEDTIME (Patient taking differently: Take 1 tablet by mouth 5 (five) times a day TAKE 1 TABLET AT 8AM, 1 1/2 tablets at 12noon, 1 tab 4pm, 1 tab at 7:30 PM, 1 tab at 12midnight), Disp: 405 tablet, Rfl: 3  •  fludrocortisone (FLORINEF) 0 1 mg tablet, Take 0 1 mg by mouth daily, Disp: , Rfl:   • naproxen sodium (ALEVE) 220 MG tablet, as needed , Disp: , Rfl:   •  Neupro 6 MG/24HR, PLACE 1 PATCH ON THE SKIN DAILY  INDICATIONS: PARKINSON'S DISEASE, Disp: , Rfl:   •  rasagiline (AZILECT) 1 MG, TAKE 1 TABLET BY MOUTH EVERY DAY, Disp: 90 tablet, Rfl: 1  •  senna (SENOKOT) 8 6 MG tablet, Take 8 6 mg by mouth daily (Patient not taking: Reported on 6/29/2023), Disp: , Rfl:   •  sodium chloride 1 g tablet, Take 1 tablet by mouth 3 (three) times a day, Disp: , Rfl:     Current Allergies     Allergies as of 06/29/2023 - Reviewed 06/29/2023   Allergen Reaction Noted   • Penicillins Hives, Itching, and Rash 08/14/2015   • Sulfa antibiotics Hives, Itching, and Rash 08/14/2015   • Amoxicillin Hives 08/14/2015            The following portions of the patient's history were reviewed and updated as appropriate: allergies, current medications, past family history, past medical history, past social history, past surgical history and problem list      Past Medical History:   Diagnosis Date   • Menopause    • Parkinson disease ([facility] [address] )    • PONV (postoperative nausea and vomiting)        Past Surgical History:   Procedure Laterality Date   • APPENDECTOMY     • BACK SURGERY  05/03/2018 2017,2018,2021   • BACK SURGERY N/A     2021 Dr Ria Wright   • BREAST BIOPSY Right 2002   • BREAST BIOPSY Left 09/22/2021   • BREAST EXCISIONAL BIOPSY Right 1996   • BREAST LUMPECTOMY Left 12/28/2021    Procedure: BREAST NEEDLE LOCALIZED (2 site) LUMPTECTOMY (2 SITE NEEDLE LOC AT 0930);   Surgeon: Raeann Bosworth, MD;  [facility];  Service: Surgical Oncology   • COLONOSCOPY     • HYSTEROSCOPY W/ ENDOMETRIAL ABLATION     • MAMMO NEEDLE LOCALIZATION LEFT (ALL INC) Left 12/28/2021   • MAMMO NEEDLE LOCALIZATION LEFT (ALL INC) EACH ADD Left 12/28/2021   • MAMMO STEREOTACTIC BREAST BIOPSY RIGHT (ALL INC) Right     benign   • MRI BREAST BIOPSY LEFT (ALL INCLUSIVE) Left 5/26/2023   • REDUCTION MAMMAPLASTY Bilateral     2007   • SALPINGOOPHORECTOMY Right • US GUIDANCE BREAST BIOPSY LEFT EACH ADDITIONAL Left 09/22/2021   • US GUIDED BREAST BIOPSY LEFT COMPLETE Left 09/22/2021       Family History   Problem Relation Age of Onset   • Leukemia Mother 80   • Diabetes Mother    • Emphysema Father    • Stroke Father    • No Known Problems Sister    • Breast cancer Sister 54   • Breast cancer Sister 54   • No Known Problems Daughter    • No Known Problems Maternal Grandmother    • No Known Problems Maternal Grandfather    • No Known Problems Paternal Grandmother    • No Known Problems Paternal Grandfather    • No Known Problems Maternal Aunt    • Cancer Maternal Aunt 22        leukemia   • No Known Problems Paternal Aunt    • No Known Problems Paternal Aunt    • No Known Problems Paternal Aunt          Medications have been verified  Objective   BP 96/55   Pulse 90   Temp 98 2 °F (36 8 °C)   Resp 18   LMP  (LMP Unknown)   SpO2 97%   No LMP recorded (lmp unknown)  Patient is postmenopausal        Physical Exam     Physical Exam  Vitals and nursing note reviewed  Constitutional:       Appearance: Normal appearance  HENT:      Head: Normocephalic  Right Ear: Ear canal and external ear normal       Left Ear: Ear canal and external ear normal       Ears:      Comments: Mild redness in left TM     Mouth/Throat:      Mouth: Mucous membranes are moist    Eyes:      Pupils: Pupils are equal, round, and reactive to light  Cardiovascular:      Rate and Rhythm: Normal rate and regular rhythm  Heart sounds: Normal heart sounds  Pulmonary:      Breath sounds: Normal breath sounds  Neurological:      General: No focal deficit present  Mental Status: She is alert and oriented to person, place, and time     Psychiatric:         Mood and Affect: Mood normal          Behavior: Behavior normal

## 2023-06-29 NOTE — PATIENT INSTRUCTIONS
Patient was educated on acute cough  Patient was prescribed antibiotics  Patient was told to eat on antibiotics  Any chest pain or shortness of breath go to ED  IF symptoms persist follow up with PCP    Acute Cough   WHAT YOU NEED TO KNOW:   An acute cough can last up to 3 weeks  Common causes of an acute cough include a cold, allergies, or a lung infection  DISCHARGE INSTRUCTIONS:   Return to the emergency department if:   You have trouble breathing or feel short of breath  You cough up blood, or you see blood in your mucus  You faint or feel weak or dizzy  You have chest pain when you cough or take a deep breath  You have new wheezing  Contact your healthcare provider if:   You have a fever  Your cough lasts longer than 4 weeks  Your symptoms do not improve with treatment  You have questions or concerns about your condition or care  Medicines:   Medicines  may be needed to stop the cough, decrease swelling in your airways, or help open your airways  Medicine may also be given to help you cough up mucus  Ask your healthcare provider what over-the-counter medicines you can take  If you have an infection caused by bacteria, you may need antibiotics  Take your medicine as directed  Contact your healthcare provider if you think your medicine is not helping or if you have side effects  Tell your provider if you are allergic to any medicine  Keep a list of the medicines, vitamins, and herbs you take  Include the amounts, and when and why you take them  Bring the list or the pill bottles to follow-up visits  Carry your medicine list with you in case of an emergency  Manage your symptoms:   Do not smoke and stay away from others who smoke  Nicotine and other chemicals in cigarettes and cigars can cause lung damage and make your cough worse  Ask your healthcare provider for information if you currently smoke and need help to quit   E-cigarettes or smokeless tobacco still contain nicotine  Talk to your healthcare provider before you use these products  Drink extra liquids as directed  Liquids will help thin and loosen mucus so you can cough it up  Liquids will also help prevent dehydration  Examples of good liquids to drink include water, fruit juice, and broth  Do not drink liquids that contain caffeine  Caffeine can increase your risk for dehydration  Ask your healthcare provider how much liquid to drink each day  Rest as directed  Do not do activities that make your cough worse, such as exercise  Use a humidifier or vaporizer  Use a cool mist humidifier or a vaporizer to increase air moisture in your home  This may make it easier for you to breathe and help decrease your cough  Eat 2 to 5 mL of honey 2 times each day  Honey can help thin mucus and decrease your cough  Use cough drops or lozenges  These can help decrease throat irritation and your cough  Follow up with your healthcare provider as directed:  Write down your questions so you remember to ask them during your visits  © Copyright [employer] 2022 Information is for End User's use only and may not be sold, redistributed or otherwise used for commercial purposes  The above information is an  only  It is not intended as medical advice for individual conditions or treatments  Talk to your doctor, nurse or pharmacist before following any medical regimen to see if it is safe and effective for you

## 2023-07-28 ENCOUNTER — OFFICE VISIT (OUTPATIENT)
Dept: SURGICAL ONCOLOGY | Facility: CLINIC | Age: 68
End: 2023-07-28
Payer: MEDICARE

## 2023-07-28 VITALS
HEART RATE: 78 BPM | BODY MASS INDEX: 21.52 KG/M2 | DIASTOLIC BLOOD PRESSURE: 60 MMHG | WEIGHT: 142 LBS | OXYGEN SATURATION: 97 % | TEMPERATURE: 97 F | RESPIRATION RATE: 18 BRPM | HEIGHT: 68 IN | SYSTOLIC BLOOD PRESSURE: 100 MMHG

## 2023-07-28 DIAGNOSIS — R92.2 DENSE BREASTS: ICD-10-CM

## 2023-07-28 DIAGNOSIS — N60.92 ATYPICAL LOBULAR HYPERPLASIA (ALH) OF LEFT BREAST: Primary | ICD-10-CM

## 2023-07-28 DIAGNOSIS — Z80.3 FAMILY HISTORY OF BREAST CANCER: ICD-10-CM

## 2023-07-28 DIAGNOSIS — Z91.89 INCREASED RISK OF BREAST CANCER: ICD-10-CM

## 2023-07-28 DIAGNOSIS — Z12.31 VISIT FOR SCREENING MAMMOGRAM: ICD-10-CM

## 2023-07-28 PROCEDURE — 99213 OFFICE O/P EST LOW 20 MIN: CPT | Performed by: NURSE PRACTITIONER

## 2023-07-28 NOTE — PROGRESS NOTES
Surgical Oncology Follow Up       [facility]  [facility] [place] [address]    Meena Sutton  1955  [id number]      Chief Complaint   Patient presents with   • Follow-up       Assessment/Plan:  1. Atypical lobular hyperplasia (ALH) of left breast  - MRI breast bilateral w and wo contrast w cad; Future  - 1 year f/u visit    2. Family history of breast cancer    3. Dense breasts  - MRI breast bilateral w and wo contrast w cad; Future    4. Increased risk of breast cancer  - MRI breast bilateral w and wo contrast w cad; Future    5. Visit for screening mammogram  - Mammo screening bilateral w 3d & cad; Future      Discussion/Summary:  Patient is a 69-year-old female with a family history of breast cancer (DCIS) in 2 of her sisters and a personal history atypical lobular hyperplasia that presents today for a follow-up visit secondary to her increased risk. She underwent an excisional biopsy with Dr. Yane Mtz in December of 2021. Her surgical pathology revealed multifocal ALH, bordering on LCIS. She has declined a consultation with medical oncology to discuss chemoprevention. She underwent genetic testing which was negative. We have recommended screening her with annual 3D mammography, annual breast MRI and clinical breast exams every 6 months. She had a bilateral 3D screening mammogram in September 2022 which was BI-RADS 2, category 4 density. She had a bilateral breast MRI in March 2023 which revealed non-mass enhancement of the left breast.  She underwent an MRI guided biopsy and pathology was benign and concordant. She offers no new complaints today and there are no concerns on today's exam.  I will make arrangements for her mammogram and breast MRI to be performed in the next year and I will see her back in 1 year. She will receive a clinical breast exam with her gynecologist in the interim.   She was instructed to contact me with any changes or concerns. All of her questions were answered today. History of Present Illness:     -Interval History: Patient presents for a follow-up visit. She notices no changes on her self-exam.  She reports no changes in her family history. She had a bilateral breast MRI which revealed non-mass enhancement of the left breast.  She had a biopsy which was benign. Review of Systems:  Review of Systems   Constitutional: Negative for chills, fatigue and fever. Respiratory: Negative for cough and shortness of breath. Cardiovascular: Negative for chest pain. Hematological: Negative for adenopathy. Psychiatric/Behavioral: Negative for confusion. Patient Active Problem List   Diagnosis   • Encounter for annual routine gynecological examination   • Parkinson's disease ([address])   • Family history of breast cancer   • Dense breasts   • Low back pain   • Breast mass in female   • Atypical lobular hyperplasia (ALH) of left breast   • Increased risk of breast cancer     Past Medical History:   Diagnosis Date   • Menopause    • Parkinson disease ([address])    • PONV (postoperative nausea and vomiting)      Past Surgical History:   Procedure Laterality Date   • APPENDECTOMY     • BACK SURGERY  05/03/2018 2017,2018,2021   • BACK SURGERY N/A     2021 Dr. Fisher Patient   • BREAST BIOPSY Right 2002   • BREAST BIOPSY Left 09/22/2021   • BREAST EXCISIONAL BIOPSY Right 1996   • BREAST LUMPECTOMY Left 12/28/2021    Procedure: BREAST NEEDLE LOCALIZED (2 site) LUMPTECTOMY (2 SITE NEEDLE LOC AT 0930);   Surgeon: Erick Whalen MD;  [facility];  Service: Surgical Oncology   • COLONOSCOPY     • HYSTEROSCOPY W/ ENDOMETRIAL ABLATION     • MAMMO NEEDLE LOCALIZATION LEFT (ALL INC) Left 12/28/2021   • MAMMO NEEDLE LOCALIZATION LEFT (ALL INC) EACH ADD Left 12/28/2021   • MAMMO STEREOTACTIC BREAST BIOPSY RIGHT (ALL INC) Right     benign   • MRI BREAST BIOPSY LEFT (ALL INCLUSIVE) Left 5/26/2023   • REDUCTION MAMMAPLASTY Bilateral    • SALPINGOOPHORECTOMY Right    • US GUIDANCE BREAST BIOPSY LEFT EACH ADDITIONAL Left 2021   • US GUIDED BREAST BIOPSY LEFT COMPLETE Left 2021     Family History   Problem Relation Age of Onset   • Leukemia Mother 80   • Diabetes Mother    • Emphysema Father    • Stroke Father    • No Known Problems Sister    • Breast cancer Sister 54   • Breast cancer Sister 54   • No Known Problems Daughter    • No Known Problems Maternal Grandmother    • No Known Problems Maternal Grandfather    • No Known Problems Paternal Grandmother    • No Known Problems Paternal Grandfather    • No Known Problems Maternal Aunt    • Cancer Maternal Aunt 22        leukemia   • No Known Problems Paternal Aunt    • No Known Problems Paternal Aunt    • No Known Problems Paternal Aunt      Social History     Socioeconomic History   • Marital status: /Civil Union     Spouse name: Not on file   • Number of children: Not on file   • Years of education: Not on file   • Highest education level: Not on file   Occupational History   • Not on file   Tobacco Use   • Smoking status: Former     Types: Cigarettes     Quit date: 1968     Years since quittin.6   • Smokeless tobacco: Never   Vaping Use   • Vaping Use: Never used   Substance and Sexual Activity   • Alcohol use: Yes     Comment: SOCIAL   • Drug use: Never   • Sexual activity: Yes     Birth control/protection: Post-menopausal   Other Topics Concern   • Not on file   Social History Narrative   • Not on file     Social Determinants of Health     Financial Resource Strain: Not on file   Food Insecurity: Not on file   Transportation Needs: Not on file   Physical Activity: Not on file   Stress: Not on file   Social Connections: Not on file   Intimate Partner Violence: Not on file   Housing Stability: Not on file       Current Outpatient Medications:   •  CALCIUM-VITAMIN D PO, Take by mouth, Disp: , Rfl:   •  carbidopa-levodopa (SINEMET)  mg per tablet, TAKE 1 TABLET AT 8AM,12,4,AND 7:30 PM.THEN 1/2 TABLET AT BEDTIME (Patient taking differently: Take 1 tablet by mouth 5 (five) times a day TAKE 1 TABLET AT 8AM, 1 1/2 tablets at 12noon, 1 tab 4pm, 1 tab at 7:30 PM, 1 tab at 12midnight), Disp: 405 tablet, Rfl: 3  •  fludrocortisone (FLORINEF) 0.1 mg tablet, Take 0.1 mg by mouth daily, Disp: , Rfl:   •  naproxen sodium (ALEVE) 220 MG tablet, as needed , Disp: , Rfl:   •  rasagiline (AZILECT) 1 MG, TAKE 1 TABLET BY MOUTH EVERY DAY, Disp: 90 tablet, Rfl: 1  •  rotigotine (Neupro) 4 MG/24HR, , Disp: , Rfl:   •  sodium chloride 1 g tablet, Take 1 tablet by mouth 3 (three) times a day, Disp: , Rfl:   •  fludrocortisone (FLORINEF) 0.1 mg tablet, Take 1 tablet by mouth daily (Patient not taking: Reported on 7/28/2023), Disp: , Rfl:   •  Neupro 6 MG/24HR, PLACE 1 PATCH ON THE SKIN DAILY. INDICATIONS: PARKINSON'S DISEASE (Patient not taking: Reported on 7/28/2023), Disp: , Rfl:   •  senna (SENOKOT) 8.6 MG tablet, Take 8.6 mg by mouth daily (Patient not taking: Reported on 6/29/2023), Disp: , Rfl:   Allergies   Allergen Reactions   • Penicillins Hives, Itching and Rash   • Sulfa Antibiotics Hives, Itching and Rash   • Amoxicillin Hives     Vitals:    07/28/23 1055   BP: 100/60   Pulse: 78   Resp: 18   Temp: (!) 97 °F (36.1 °C)   SpO2: 97%       Physical Exam  Vitals reviewed. Constitutional:       General: She is not in acute distress. Appearance: Normal appearance. She is well-developed. She is not diaphoretic. HENT:      Head: Normocephalic and atraumatic. Pulmonary:      Effort: Pulmonary effort is normal.   Chest:   Breasts:     Right: Skin change (reduction scars) present. No swelling, bleeding, inverted nipple, mass, nipple discharge or tenderness. Left: Skin change (reduction scars) present. No swelling, bleeding, inverted nipple, mass, nipple discharge or tenderness.       Comments: Left upper outer quadrant surgical scar  Abdominal:      Palpations: Abdomen is soft. There is no mass. Tenderness: There is no abdominal tenderness. Musculoskeletal:         General: Normal range of motion. Cervical back: Normal range of motion. Lymphadenopathy:      Upper Body:      Right upper body: No supraclavicular or axillary adenopathy. Left upper body: No supraclavicular or axillary adenopathy. Skin:     General: Skin is warm and dry. Findings: No rash. Neurological:      Mental Status: She is alert and oriented to person, place, and time. Motor: Tremor present. Psychiatric:         Speech: Speech normal.             Advance Care Planning/Advance Directives:  Discussed disease status and treatment goals with the patient.

## 2023-10-04 ENCOUNTER — HOSPITAL ENCOUNTER (OUTPATIENT)
Dept: MAMMOGRAPHY | Facility: IMAGING CENTER | Age: 68
Discharge: HOME | End: 2023-10-04
Payer: MEDICARE

## 2023-10-04 VITALS — WEIGHT: 142 LBS | HEIGHT: 68 IN | BODY MASS INDEX: 21.52 KG/M2

## 2023-10-04 DIAGNOSIS — Z12.31 VISIT FOR SCREENING MAMMOGRAM: ICD-10-CM

## 2023-10-04 PROCEDURE — 77067 SCR MAMMO BI INCL CAD: CPT

## 2023-10-04 PROCEDURE — 77063 BREAST TOMOSYNTHESIS BI: CPT

## 2024-01-05 ENCOUNTER — TELEPHONE (OUTPATIENT)
Dept: OBGYN CLINIC | Facility: CLINIC | Age: 69
End: 2024-01-05

## 2024-01-05 ENCOUNTER — OFFICE VISIT (OUTPATIENT)
Dept: OBGYN CLINIC | Facility: CLINIC | Age: 69
End: 2024-01-05
Payer: MEDICARE

## 2024-01-05 VITALS
WEIGHT: 142.8 LBS | HEIGHT: 68 IN | DIASTOLIC BLOOD PRESSURE: 60 MMHG | HEART RATE: 52 BPM | BODY MASS INDEX: 21.64 KG/M2 | SYSTOLIC BLOOD PRESSURE: 98 MMHG

## 2024-01-05 DIAGNOSIS — N95.0 PMB (POSTMENOPAUSAL BLEEDING): Primary | ICD-10-CM

## 2024-01-05 DIAGNOSIS — N89.8 VAGINAL DISCHARGE: ICD-10-CM

## 2024-01-05 PROCEDURE — 99213 OFFICE O/P EST LOW 20 MIN: CPT | Performed by: OBSTETRICS & GYNECOLOGY

## 2024-01-05 NOTE — PROGRESS NOTES
"Assessment/Plan:    Given that pt experiences mostly brown d/c, will obtain TVU and pt will RTO for attempt at EMB although explained with post ablation, it may be difficult.   Sureswab also obtained given color of d/c today. Will notify pt of results. Conservative personal hygiene reviewed.     Diagnoses and all orders for this visit:    PMB (postmenopausal bleeding)  -     US pelvis complete w transvaginal; Future    Vaginal discharge  -     Sureswab(R), Bacterial Vaginosis/Vaginitis        Subjective:      Patient ID: Gladys Guillory is a 68 y.o. female.    Pt presents with vaginal discharge for 1 month. Discharge varies in color from brown, yellow, green, \"but mostly brown.\" Cannot determine if there's an odor secondary to parkinson's. Denies itching, irritation. She is sexually active. Denies dyspareunia.   Uses Dove body wash for personal hygiene.   Pt is s/p ablation.        The following portions of the patient's history were reviewed and updated as appropriate: allergies, current medications, past family history, past medical history, past social history, past surgical history and problem list.    Review of Systems   Constitutional: Negative.    Respiratory: Negative.     Cardiovascular: Negative.    Gastrointestinal: Negative.    Endocrine: Negative.    Genitourinary:  Positive for vaginal discharge. Negative for dyspareunia, dysuria, frequency, pelvic pain, urgency and vaginal pain.   Musculoskeletal: Negative.    Skin: Negative.    Neurological: Negative.    Psychiatric/Behavioral: Negative.           Objective:      BP 98/60   Pulse (!) 52   Ht 5' 8\" (1.727 m)   Wt 64.8 kg (142 lb 12.8 oz)   LMP  (LMP Unknown)   BMI 21.71 kg/m²          Physical Exam  Vitals and nursing note reviewed. Exam conducted with a chaperone present.   Constitutional:       Appearance: Normal appearance.   HENT:      Head: Normocephalic and atraumatic.   Pulmonary:      Effort: Pulmonary effort is normal.   Abdominal:      " Hernia: A hernia is present. There is no hernia in the left inguinal area or right inguinal area.   Genitourinary:     General: Normal vulva.      Exam position: Supine.      Pubic Area: No rash.       Labia:         Right: No rash, tenderness, lesion or injury.         Left: No rash, tenderness, lesion or injury.       Vagina: No signs of injury and foreign body. Vaginal discharge (moderate amount of thick yellow green discharge, sure swab obtained) present. No erythema, tenderness, bleeding or lesions.      Cervix: No cervical motion tenderness, discharge, friability, lesion, erythema, cervical bleeding or eversion.      Uterus: Not deviated, not enlarged, not fixed and not tender.       Adnexa:         Right: No mass, tenderness or fullness.          Left: No mass, tenderness or fullness.        Comments: Moderate VVA  Musculoskeletal:      Cervical back: Normal range of motion.   Lymphadenopathy:      Lower Body: No right inguinal adenopathy. No left inguinal adenopathy.   Skin:     General: Skin is warm and dry.   Neurological:      Mental Status: She is alert and oriented to person, place, and time.   Psychiatric:         Mood and Affect: Mood normal.         Behavior: Behavior normal.         Thought Content: Thought content normal.         Judgment: Judgment normal.

## 2024-01-06 LAB
BV BACTERIA RRNA VAG QL NAA+PROBE: NEGATIVE
CANDIDA RRNA VAG QL PROBE: NORMAL
T VAGINALIS RRNA SPEC QL NAA+PROBE: NORMAL

## 2024-01-10 ENCOUNTER — TELEPHONE (OUTPATIENT)
Dept: OBGYN CLINIC | Facility: CLINIC | Age: 69
End: 2024-01-10

## 2024-01-10 NOTE — TELEPHONE ENCOUNTER
Spoke to pt and notified her that BV was neg. They could not perform yeast.   She is RTO on 1/15/24 for EMB and has TVU scheduled for 1/19/24.

## 2024-01-15 ENCOUNTER — PROCEDURE VISIT (OUTPATIENT)
Dept: OBGYN CLINIC | Facility: CLINIC | Age: 69
End: 2024-01-15
Payer: MEDICARE

## 2024-01-15 VITALS
BODY MASS INDEX: 21.22 KG/M2 | SYSTOLIC BLOOD PRESSURE: 96 MMHG | DIASTOLIC BLOOD PRESSURE: 60 MMHG | WEIGHT: 140 LBS | HEART RATE: 93 BPM | HEIGHT: 68 IN

## 2024-01-15 DIAGNOSIS — N95.0 PMB (POSTMENOPAUSAL BLEEDING): Primary | ICD-10-CM

## 2024-01-15 PROCEDURE — 58100 BIOPSY OF UTERUS LINING: CPT | Performed by: OBSTETRICS & GYNECOLOGY

## 2024-01-15 PROCEDURE — 88305 TISSUE EXAM BY PATHOLOGIST: CPT | Performed by: PATHOLOGY

## 2024-01-15 NOTE — PROGRESS NOTES
"Endometrial biopsy    Date/Time: 1/15/2024 2:11 PM    Performed by: FABIOLA Cline  Authorized by: FABIOLA Cline  Universal Protocol:  Consent: Verbal consent obtained. Written consent obtained.  Risks and benefits: risks, benefits and alternatives were discussed (infection, bleeding, pain, injury to surrounding tissue, uterine perforation, insufficient sampling requiring repeat or additional procedure)  Time out: Immediately prior to procedure a \"time out\" was called to verify the correct patient, procedure, equipment, support staff and site/side marked as required.  Timeout called at: 1/15/2024 2:11 PM.  Patient understanding: patient states understanding of the procedure being performed  Patient consent: the patient's understanding of the procedure matches consent given  Procedure consent: procedure consent matches procedure scheduled  Required items: required blood products, implants, devices, and special equipment available  Patient identity confirmed: verbally with patient    Indication:     Indications: Post-menopausal bleeding      Chronicity of post-menopausal bleeding:  New (brown discharge for 1 month)    Progression of post-menopausal bleeding:  Unchanged  Pre-procedure:     Premeds:  Naproxen sodium  Procedure:     Procedure: endometrial biopsy with Pipelle      A bivalve speculum was placed in the vagina: yes      Cervix cleaned and prepped: yes      Uterus sound depth (cm):  5    Specimen collected: specimen collected and sent to pathology      Specimen collected comment:  Scant specimen secondary to cervical stenosis  Findings:     Uterus size:  Non-gravid    Cervix: stenotic    Comments:     Procedure comments:  Pt has a hx of an ablation  Has TVU scheduled in 4 days, will notify pt of results      "

## 2024-01-18 ENCOUNTER — HOSPITAL ENCOUNTER (OUTPATIENT)
Dept: ULTRASOUND IMAGING | Facility: HOSPITAL | Age: 69
Discharge: HOME | End: 2024-01-18
Payer: MEDICARE

## 2024-01-18 DIAGNOSIS — N95.0 PMB (POSTMENOPAUSAL BLEEDING): ICD-10-CM

## 2024-01-18 PROCEDURE — 76830 TRANSVAGINAL US NON-OB: CPT

## 2024-01-18 PROCEDURE — 76856 US EXAM PELVIC COMPLETE: CPT

## 2024-01-18 PROCEDURE — 88305 TISSUE EXAM BY PATHOLOGIST: CPT | Performed by: PATHOLOGY

## 2024-01-25 ENCOUNTER — TELEPHONE (OUTPATIENT)
Dept: OBGYN CLINIC | Facility: CLINIC | Age: 69
End: 2024-01-25

## 2024-01-26 ENCOUNTER — TELEPHONE (OUTPATIENT)
Dept: OBGYN CLINIC | Facility: CLINIC | Age: 69
End: 2024-01-26

## 2024-01-26 DIAGNOSIS — N83.202 LEFT OVARIAN CYST: ICD-10-CM

## 2024-01-26 DIAGNOSIS — N95.0 PMB (POSTMENOPAUSAL BLEEDING): Primary | ICD-10-CM

## 2024-01-26 NOTE — TELEPHONE ENCOUNTER
Pt made aware of left ovarian cyst noted on TVU. Made pt aware that I would like her to be eval'd by gyn/onc for possible PMB and left ovarian cyst. She was in agreement. Referral placed.

## 2024-01-28 NOTE — TELEPHONE ENCOUNTER
Deena called in stating that she scheduled for on Oct 14, 2021 in which she needs to cancel due to having to prioritize a different procedure first  Please review and give the patient a call back to r/s  Patient/Caregiver provided printed discharge information.

## 2024-01-29 ENCOUNTER — TELEPHONE (OUTPATIENT)
Dept: HEMATOLOGY ONCOLOGY | Facility: CLINIC | Age: 69
End: 2024-01-29

## 2024-01-29 DIAGNOSIS — N94.9 ADNEXAL CYST: Primary | ICD-10-CM

## 2024-01-29 NOTE — TELEPHONE ENCOUNTER
I called [place] in response to a referral that was received for patient to establish care with Gynecologic Oncology.     Outreach was made to schedule a consultation.    I left a voicemail explaining the reason for my call and advised patient to call [facility] at [phone].  Another attempt will be made to contact patient.    7d not required

## 2024-01-29 NOTE — TELEPHONE ENCOUNTER
I called [place] in response to a referral that was received for patient to establish care with Gynecologic Oncology.     Outreach was made to complete patient's intake questionnaire .    Patient's intake questionnaire was reviewed and complete. Patient's intake has been sent to the team for clinical review.

## 2024-02-12 ENCOUNTER — CONSULT (OUTPATIENT)
Dept: GYNECOLOGIC ONCOLOGY | Facility: CLINIC | Age: 69
End: 2024-02-12
Payer: MEDICARE

## 2024-02-12 ENCOUNTER — PATIENT OUTREACH (OUTPATIENT)
Dept: CASE MANAGEMENT | Facility: HOSPITAL | Age: 69
End: 2024-02-12

## 2024-02-12 VITALS
WEIGHT: 145.2 LBS | BODY MASS INDEX: 22.01 KG/M2 | RESPIRATION RATE: 16 BRPM | DIASTOLIC BLOOD PRESSURE: 68 MMHG | HEART RATE: 86 BPM | OXYGEN SATURATION: 96 % | HEIGHT: 68 IN | SYSTOLIC BLOOD PRESSURE: 120 MMHG | TEMPERATURE: 97.5 F

## 2024-02-12 DIAGNOSIS — D39.9 NEOPLASM OF UNCERTAIN BEHAVIOR OF FEMALE GENITAL ORGAN, UNSPECIFIED: ICD-10-CM

## 2024-02-12 DIAGNOSIS — Z12.4 CERVICAL CANCER SCREENING: Primary | ICD-10-CM

## 2024-02-12 DIAGNOSIS — N95.0 PMB (POSTMENOPAUSAL BLEEDING): ICD-10-CM

## 2024-02-12 DIAGNOSIS — N83.202 LEFT OVARIAN CYST: ICD-10-CM

## 2024-02-12 PROCEDURE — 87624 HPV HI-RISK TYP POOLED RSLT: CPT | Performed by: OBSTETRICS & GYNECOLOGY

## 2024-02-12 PROCEDURE — 88175 CYTOPATH C/V AUTO FLUID REDO: CPT | Performed by: OBSTETRICS & GYNECOLOGY

## 2024-02-12 PROCEDURE — 99204 OFFICE O/P NEW MOD 45 MIN: CPT | Performed by: OBSTETRICS & GYNECOLOGY

## 2024-02-12 NOTE — PROGRESS NOTES
Assessment/Plan:    Problem List Items Addressed This Visit       PMB (postmenopausal bleeding)     Patient has more of a discharge then postmenopausal vaginal bleeding.  A routine Pap smear was performed.  Previous biopsy of the endocervix was unremarkable.  Biopsy of the endometrium is impossible due to prior endometrial ablation.    At this point the patient's endometrium is 1.3 mm.  Her likelihood of a malignancy is well under 3%.  We have discussed that the endometrium is not reasonably sampled.  We have discussed options of hysterectomy versus observation versus hormones.  The patient would prefer not to use hormones at this time.  I feel that the risk of hysterectomy is out of bounds with the risk of malignancy of less than 3%.  We have recommended observation.         Left ovarian cyst     It is a simple left ovarian cyst.  She does note some pelvic fullness.  We discussed that a ovarian cyst is less than 1% risk of malignancy.  I have recommended a  given the patient's symptoms.  Her exam is notable for a cyst in the left lower quadrant.  If the  is normal we recommended follow-up with ultrasound in approximately 3 months.         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 female  Patient is a very pleasant 68-year-old female seen for evaluation and management of postmenopausal bleeding and adnexal cyst.    Patient was in her usual state of health until she began to have vaginal discharge over for the past several months.  This is described as brown to green to yellow.  The patient underwent infection screening test which were all negative..    The patient had no further symptoms.  A pelvic ultrasound was performed which reveals the  following:  IMPRESSION:     1. Endometrial thickness is within limits for a postmenopausal patient measuring 1.3 mm. Trace fluid within the endometrial canal.  2. Unilocular anechoic cyst in the left ovary measuring 5.2 x 3.3 x 4.1 cm. Based on the ACR O-RADS system, this is O-RADS category 2 (almost certainly benign with <1% risk of malignancy).    An attempt at Pipelle biopsy was performed and reveals the following:  Final Diagnosis  A. Endometrium (biopsy):  - Extremely scant benign endocervical glands  Comment;  - Suggest repeat biopsy given absence of endometrium.    Today, the patient is doing well.  She denies significant abdominal pain, pelvic pain, nausea, vomiting, constipation, diarrhea, fevers, chills, or vaginal bleeding.  She continues with the vaginal discharge and does note some pelvic fullness but no specific pain.  Her pelvic fullness has been going on since the biopsy attempt.             Review of Systems   Constitutional: Negative.    HENT: Negative.     Eyes: Negative.    Respiratory: Negative.     Cardiovascular: Negative.    Gastrointestinal: Negative.    Endocrine: Negative.    Genitourinary:  Positive for vaginal discharge.   Musculoskeletal: Negative.    Skin: Negative.    Neurological: Negative.    Hematological: Negative.    Psychiatric/Behavioral: Negative.         Current Outpatient Medications   Medication Sig Dispense Refill    CALCIUM-VITAMIN D PO Take by mouth      carbidopa-levodopa (SINEMET)  mg per tablet TAKE 1 TABLET AT 8AM,12,4,AND 7:30 PM.THEN 1/2 TABLET AT BEDTIME (Patient taking differently: Take 1 tablet by mouth 5 (five) times a day TAKE 1 TABLET AT 8AM, 1 1/2 tablets at 12noon, 1 tab 4pm, 1 tab at 7:30 PM, 1 tab at 12midnight) 405 tablet 3    fludrocortisone (FLORINEF) 0.1 mg tablet Take 0.1 mg by mouth daily      naproxen sodium (ALEVE) 220 MG tablet as needed       rasagiline (AZILECT) 1 MG TAKE 1 TABLET BY MOUTH EVERY DAY 90 tablet 1    rotigotine (Neupro) 4  MG/24HR        No current facility-administered medications for this visit.       Allergies   Allergen Reactions    Penicillins Hives, Itching and Rash    Sulfa Antibiotics Hives, Itching and Rash    Amoxicillin Hives       Past Medical History:   Diagnosis Date    Hypotension     Menopause     Miscarriage     Parkinson disease     PONV (postoperative nausea and vomiting)     Varicella        Past Surgical History:   Procedure Laterality Date    APPENDECTOMY      BACK SURGERY  2018,,    BACK SURGERY N/A      Dr. luciano    BREAST BIOPSY Right     Benign    BREAST BIOPSY Left 2021    BREAST EXCISIONAL BIOPSY Right     Benign    BREAST LUMPECTOMY Left 2021    Procedure: BREAST NEEDLE LOCALIZED (2 site) LUMPTECTOMY (2 SITE NEEDLE LOC AT 0930);  Surgeon: Jewel Caballero MD;  [facility];  Service: Surgical Oncology    COLONOSCOPY      HYSTEROSCOPY W/ ENDOMETRIAL ABLATION      MAMMO NEEDLE LOCALIZATION LEFT (ALL INC) Left 2021    MAMMO NEEDLE LOCALIZATION LEFT (ALL INC) EACH ADD Left 2021    MAMMO STEREOTACTIC BREAST BIOPSY RIGHT (ALL INC) Right     benign    MRI BREAST BIOPSY LEFT (ALL INCLUSIVE) Left 2023    Benign    REDUCTION MAMMAPLASTY Bilateral     2007    SALPINGOOPHORECTOMY Right     TONSILLECTOMY      US GUIDANCE BREAST BIOPSY LEFT EACH ADDITIONAL Left 2021    US GUIDED BREAST BIOPSY LEFT COMPLETE Left 2021       OB History          5    Para   3    Term   3            AB        Living             SAB        IAB        Ectopic        Multiple        Live Births   3                 Family History   Problem Relation Age of Onset    Leukemia Mother 89    Diabetes Mother     Emphysema Father     Stroke Father     No Known Problems Sister     Breast cancer Sister 55    Breast cancer Sister 55    No Known Problems Daughter     No Known Problems Maternal Grandmother     No Known Problems Maternal Grandfather   "   No Known Problems Paternal Grandmother     No Known Problems Paternal Grandfather     No Known Problems Son     No Known Problems Son     No Known Problems Maternal Aunt     Leukemia Maternal Aunt 22    Cancer Maternal Aunt 22        leukemia    No Known Problems Paternal Aunt     No Known Problems Paternal Aunt     No Known Problems Paternal Aunt        The following portions of the patient's history were reviewed and updated as appropriate: allergies, current medications, past family history, past medical history, past social history, past surgical history, and problem list.      Objective:    Blood pressure 120/68, pulse 86, temperature 97.5 °F (36.4 °C), temperature source Temporal, resp. rate 16, height 5' 8\" (1.727 m), weight 65.9 kg (145 lb 3.2 oz), SpO2 96%, not currently breastfeeding.  Body mass index is 22.08 kg/m².    Physical Exam  Constitutional:       Appearance: She is well-developed.   HENT:      Head: Normocephalic and atraumatic.   Eyes:      Pupils: Pupils are equal, round, and reactive to light.   Cardiovascular:      Rate and Rhythm: Normal rate and regular rhythm.      Heart sounds: Normal heart sounds.   Pulmonary:      Effort: Pulmonary effort is normal. No respiratory distress.      Breath sounds: Normal breath sounds.   Abdominal:      General: Bowel sounds are normal. There is no distension.      Palpations: Abdomen is soft. Abdomen is not rigid.      Tenderness: There is no abdominal tenderness. There is no guarding or rebound.   Genitourinary:     Comments: -Normal external female genitalia, normal Bartholin's and [place]'s glands                  -Normal midline urethral meatus. No lesions notes                  -Bladder without fullness mass or tenderness                  -Vagina without lesion or discharge No significant cystocele or rectocele noted                  -Cervix normal appearing without visible lesions                  -Uterus with normal contour, mobility. No " "tenderness,                  -Adnexae with palpable fullness on the left at 5 x 3 cm consistent with ovarian cyst.  No extraovarian nodularity is noted                  - Anus without fissure of lesion    Musculoskeletal:         General: Normal range of motion.      Cervical back: Normal range of motion and neck supple.   Lymphadenopathy:      Cervical: No cervical adenopathy.      Upper Body:      Right upper body: No supraclavicular adenopathy.      Left upper body: No supraclavicular adenopathy.   Skin:     General: Skin is warm and dry.   Neurological:      Mental Status: She is alert and oriented to person, place, and time.   Psychiatric:         Behavior: Behavior normal.           No results found for: \"\"  Lab Results   Component Value Date    WBC 6.40 12/03/2021    HGB 13.2 12/03/2021    HCT 40.7 12/03/2021     (H) 12/03/2021     12/03/2021     Lab Results   Component Value Date     01/16/2014    K 3.9 01/26/2023     01/26/2023    CO2 28 01/26/2023    ANIONGAP 7 01/16/2014    BUN 24 01/26/2023    CREATININE 0.66 01/26/2023    GLUCOSE 138 01/16/2014    CALCIUM 8.7 01/26/2023    AST 15 01/26/2023    ALT 7 01/26/2023    ALKPHOS 70 01/26/2023    PROT 7.0 01/16/2014    BILITOT 0.7 01/16/2014    EGFR 95 01/26/2023        Trend:  No results found for: \"\"     "

## 2024-02-12 NOTE — ASSESSMENT & PLAN NOTE
Patient has more of a discharge then postmenopausal vaginal bleeding.  A routine Pap smear was performed.  Previous biopsy of the endocervix was unremarkable.  Biopsy of the endometrium is impossible due to prior endometrial ablation.    At this point the patient's endometrium is 1.3 mm.  Her likelihood of a malignancy is well under 3%.  We have discussed that the endometrium is not reasonably sampled.  We have discussed options of hysterectomy versus observation versus hormones.  The patient would prefer not to use hormones at this time.  I feel that the risk of hysterectomy is out of bounds with the risk of malignancy of less than 3%.  We have recommended observation.

## 2024-02-12 NOTE — ASSESSMENT & PLAN NOTE
It is a simple left ovarian cyst.  She does note some pelvic fullness.  We discussed that a ovarian cyst is less than 1% risk of malignancy.  I have recommended a  given the patient's symptoms.  Her exam is notable for a cyst in the left lower quadrant.  If the  is normal we recommended follow-up with ultrasound in approximately 3 months.

## 2024-02-14 ENCOUNTER — TELEPHONE (OUTPATIENT)
Dept: HEMATOLOGY ONCOLOGY | Facility: CLINIC | Age: 69
End: 2024-02-14

## 2024-02-14 NOTE — TELEPHONE ENCOUNTER
Spoke with Ginger and chart reviewed and recent pelvic exam by Dr Mendez was completed and essentially unremarkable.  She is under routine observations will f/u US.  She voices mild discomfort in the area of her left ovary however also offers she was playing with her grand kids and picking them up a lot.  Symptoms are vague and mild, and she does mention that pelvic exams are not comfortable for her.  She is in NO acute distress at this time and was calling for reassurance as she has a trip coming up.  Informed by her description and Dr Mendez's recent assessment there appears to be  nothing concerning that would prohibit her from taking her trip.  She will follow up as needed.

## 2024-02-14 NOTE — TELEPHONE ENCOUNTER
Call Transfer   Who are you speaking with?  Patient   If it is not the patient, are they listed on an active communication consent form? N/A   Who is the patients HemOnc/SurgOnc provider? Dr. Mendez   What is the reason for this call? Pt calling in regards to having increased pressure in her uterus and left ovary since having an internal exam on Monday 2/12/24 with Dr Mendez. Pt has been having discharge since November and was referred to Dr Mendez for evaluation. Pt is leaving for [place] tomorrow and has concerns about the pressure she is feeling. Pt was transferred to Linda Martin for advice.    Person/Department that the call was transferred to?    Time that call was transferred?    Linda Martin    4:11 PM   Your call will be transferred now. If you receive a voicemail, please leave a detailed message and a member of the team will return your call as soon as possible.    Did you relay this information to the caller?  Yes

## 2024-02-15 LAB
HPV HR 12 DNA CVX QL NAA+PROBE: NEGATIVE
HPV16 DNA CVX QL NAA+PROBE: NEGATIVE
HPV18 DNA CVX QL NAA+PROBE: NEGATIVE

## 2024-02-19 LAB
LAB AP GYN PRIMARY INTERPRETATION: NORMAL
Lab: NORMAL

## 2024-02-21 PROBLEM — Z01.419 ENCOUNTER FOR ANNUAL ROUTINE GYNECOLOGICAL EXAMINATION: Status: RESOLVED | Noted: 2018-06-21 | Resolved: 2024-02-21

## 2024-03-25 ENCOUNTER — TELEPHONE (OUTPATIENT)
Dept: HEMATOLOGY ONCOLOGY | Facility: CLINIC | Age: 69
End: 2024-03-25

## 2024-03-25 NOTE — TELEPHONE ENCOUNTER
Appointment Change  Cancel, Reschedule, Change to Virtual      Who are you speaking with? Patient   If it is not the patient, is the caller listed on the communication consent form? N/A   Which provider is the appointment scheduled with? FABIOLA Schwab   When was the original appointment scheduled?    Please list date and time 8/2/24 11:00am   At which location is the appointment scheduled to take place? [place]   Was the appointment rescheduled?     Was the appointment changed from an in person visit to a virtual visit?    If so, please list the details of the change. 8/5/24 1:30pm   What is the reason for the appointment change? Patient schedule conflict       Was STAR transport scheduled? No   Does STAR transport need to be scheduled for the new visit (if applicable) No   Does the patient need an infusion appointment rescheduled? No   Does the patient have an upcoming infusion appointment scheduled? If so, when? No   Is the patient undergoing chemotherapy? No   For appointments cancelled with less than 24 hours:  Was the no-show policy reviewed? Yes

## 2024-05-13 ENCOUNTER — APPOINTMENT (OUTPATIENT)
Dept: LAB | Facility: CLINIC | Age: 69
End: 2024-05-13
Payer: MEDICARE

## 2024-05-13 DIAGNOSIS — D39.9 NEOPLASM OF UNCERTAIN BEHAVIOR OF FEMALE GENITAL ORGAN, UNSPECIFIED: ICD-10-CM

## 2024-05-13 DIAGNOSIS — N83.202 LEFT OVARIAN CYST: ICD-10-CM

## 2024-05-13 LAB — CANCER AG125 SERPL-ACNC: 18 U/ML (ref 0–35)

## 2024-05-13 PROCEDURE — 36415 COLL VENOUS BLD VENIPUNCTURE: CPT

## 2024-05-13 PROCEDURE — 86304 IMMUNOASSAY TUMOR CA 125: CPT

## 2024-05-16 ENCOUNTER — HOSPITAL ENCOUNTER (OUTPATIENT)
Dept: ULTRASOUND IMAGING | Facility: HOSPITAL | Age: 69
End: 2024-05-16
Payer: MEDICARE

## 2024-05-16 DIAGNOSIS — N83.202 LEFT OVARIAN CYST: ICD-10-CM

## 2024-05-16 PROCEDURE — 76830 TRANSVAGINAL US NON-OB: CPT

## 2024-05-16 PROCEDURE — 76856 US EXAM PELVIC COMPLETE: CPT

## 2024-05-20 ENCOUNTER — OFFICE VISIT (OUTPATIENT)
Dept: GYNECOLOGIC ONCOLOGY | Facility: CLINIC | Age: 69
End: 2024-05-20
Payer: MEDICARE

## 2024-05-20 ENCOUNTER — TELEPHONE (OUTPATIENT)
Dept: HEMATOLOGY ONCOLOGY | Facility: CLINIC | Age: 69
End: 2024-05-20

## 2024-05-20 VITALS
HEART RATE: 72 BPM | WEIGHT: 144 LBS | OXYGEN SATURATION: 94 % | SYSTOLIC BLOOD PRESSURE: 118 MMHG | DIASTOLIC BLOOD PRESSURE: 70 MMHG | TEMPERATURE: 97.7 F | BODY MASS INDEX: 21.9 KG/M2

## 2024-05-20 DIAGNOSIS — N95.0 PMB (POSTMENOPAUSAL BLEEDING): ICD-10-CM

## 2024-05-20 DIAGNOSIS — N83.202 LEFT OVARIAN CYST: Primary | ICD-10-CM

## 2024-05-20 PROCEDURE — 99213 OFFICE O/P EST LOW 20 MIN: CPT | Performed by: OBSTETRICS & GYNECOLOGY

## 2024-05-20 NOTE — LETTER
May 20, 2024     FABIOLA Cline  [address] [facility]  [address] [place] [address]    Patient: Gladys Guillory   YOB: 1955   Date of Visit: 5/20/2024       Dear Dr. Duarte:    Thank you for referring Gladys Guillory to me for evaluation. Below are my notes for this consultation.    If you have questions, please do not hesitate to call me. I look forward to following your patient along with you.         Sincerely,        Carson Mendez MD        CC: No Recipients    Carson Mendez MD  5/20/2024 11:18 AM  Incomplete  Assessment/Plan:    Problem List Items Addressed This Visit       Left ovarian cyst - Primary    Relevant Orders    US pelvis complete w transvaginal         CHIEF COMPLAINT:           Patient ID: Gladys Guillory is a 69 y.o. female  Patient is a very pleasant 69-year-old female with an incidentally identified ovarian cyst.  This was simple in nature and recommended to be followed .  Additionally the patient had vaginal discharge which is stopped after her last visit.    The patient has recently undergone a pelvic ultrasound in follow-up.  This reveals the following:  FINDINGS:     UTERUS:  The uterus is anteverted in position, measuring 6.3 x 2.6 x 4.7 cm.  Myometrium: Normal contour and echogenicity.  No masses.  Cervix: Normal in appearance.        ENDOMETRIUM:  Thickness: 1.0 mm.  Echotexture: Normal and homogenous in echogenicity with no evidence of endometrial mass or fluid collection.     OVARIES/ADNEXA:  Absent.     Left ovary:  6.0 x 4.2 x 4.7 cm. 61.3 mL  No suspicious left ovarian abnormality.  5.1 x 3.7 x 3.2 cm simple unilocular cyst (previously measuring 5.2 x 4.1 x 3.3 cm).  Doppler flow within normal limits.     No suspicious adnexal mass.     FREE FLUID: None.        IMPRESSION:     5.1 x 3.7 x 3.2 cm simple, unilocular left ovarian cyst, not significantly changed in size since a sonogram from 1/18/2024. Based on the ACR O-RADS US v2022 system, this is O-RADS  category 2 (almost certain benign with <1% risk of malignancy.) The   management recommendation in this postmenopausal patient is 12-month follow-up pelvic sonogram.    Today, the patient is doing well.  She denies significant abdominal pain, pelvic pain, nausea, vomiting, constipation, diarrhea, fevers, chills, or vaginal bleeding.             The following portions of the patient's history were reviewed and updated as appropriate: allergies, current medications, past family history, past medical history, past social history, past surgical history, and problem list.    Review of Systems   Constitutional: Negative.    HENT: Negative.     Eyes: Negative.    Respiratory: Negative.     Cardiovascular: Negative.    Gastrointestinal: Negative.    Endocrine: Negative.    Genitourinary: Negative.    Musculoskeletal: Negative.    Skin: Negative.    Neurological: Negative.    Hematological: Negative.    Psychiatric/Behavioral: Negative.         Current Outpatient Medications   Medication Sig Dispense Refill   • CALCIUM-VITAMIN D PO Take by mouth     • carbidopa-levodopa (SINEMET)  mg per tablet TAKE 1 TABLET AT 8AM,12,4,AND 7:30 PM.THEN 1/2 TABLET AT BEDTIME (Patient taking differently: Take 1 tablet by mouth 5 (five) times a day TAKE 1 TABLET AT 8AM, 1 1/2 tablets at 12noon, 1 tab 4pm, 1 tab at 7:30 PM, 1 tab at 12midnight) 405 tablet 3   • fludrocortisone (FLORINEF) 0.1 mg tablet Take 0.1 mg by mouth daily     • naproxen sodium (ALEVE) 220 MG tablet as needed      • rasagiline (AZILECT) 1 MG TAKE 1 TABLET BY MOUTH EVERY DAY 90 tablet 1   • rotigotine (Neupro) 4 MG/24HR        No current facility-administered medications for this visit.           Objective:    not currently breastfeeding.  There is no height or weight on file to calculate BMI.  There is no height or weight on file to calculate BSA.    Physical Exam  Constitutional:       Appearance: She is well-developed.   HENT:      Head: Normocephalic and  atraumatic.   Eyes:      Pupils: Pupils are equal, round, and reactive to light.   Cardiovascular:      Rate and Rhythm: Normal rate and regular rhythm.      Heart sounds: Normal heart sounds.   Pulmonary:      Effort: Pulmonary effort is normal. No respiratory distress.      Breath sounds: Normal breath sounds.   Abdominal:      General: Bowel sounds are normal. There is no distension.      Palpations: Abdomen is soft. Abdomen is not rigid.      Tenderness: There is no abdominal tenderness. There is no guarding or rebound.   Genitourinary:     Comments: -Normal external female genitalia, normal Bartholin's and Buck[place]'s glands                  -Normal midline urethral meatus. No lesions notes                  -Bladder without fullness mass or tenderness                  -Vagina without lesion or discharge No significant cystocele or rectocele noted                  -Cervix normal appearing without visible lesions                  -Uterus with normal contour, mobility. No tenderness,                  -Adnexae without  mass or tenderness                  - Anus without fissure of lesion    Musculoskeletal:         General: Normal range of motion.      Cervical back: Normal range of motion and neck supple.   Lymphadenopathy:      Cervical: No cervical adenopathy.      Upper Body:      Right upper body: No supraclavicular adenopathy.      Left upper body: No supraclavicular adenopathy.   Skin:     General: Skin is warm and dry.   Neurological:      Mental Status: She is alert and oriented to person, place, and time.   Psychiatric:         Behavior: Behavior normal.         Lab Results   Component Value Date     18.0 05/13/2024     Lab Results   Component Value Date     01/16/2014    K 3.9 01/26/2023     01/26/2023    CO2 28 01/26/2023    ANIONGAP 7 01/16/2014    BUN 24 01/26/2023    CREATININE 0.66 01/26/2023    GLUCOSE 138 01/16/2014    CALCIUM 8.7 01/26/2023    AST 15 01/26/2023    ALT 7 01/26/2023     ALKPHOS 70 01/26/2023    PROT 7.0 01/16/2014    BILITOT 0.7 01/16/2014    EGFR 95 01/26/2023     Lab Results   Component Value Date    WBC 6.40 12/03/2021    HGB 13.2 12/03/2021    HCT 40.7 12/03/2021     (H) 12/03/2021     12/03/2021     Lab Results   Component Value Date    NEUTROABS 4.45 12/03/2021        Trend:  Lab Results   Component Value Date     18.0 05/13/2024

## 2024-05-20 NOTE — ASSESSMENT & PLAN NOTE
Patient's postmenopausal bleeding has resolved since her last visit.  Her endometrial stripe is 1 mm.

## 2024-05-20 NOTE — TELEPHONE ENCOUNTER
Appointment Confirmation   Who are you speaking with? Patient   If it is not the patient, are they listed on an active communication consent form? N/A   Which provider is the appointment scheduled with?  Dr. Mendez   When is the appointment scheduled?  Please list date and time 5/20/24 11am   At which location is the appointment scheduled to take place? [place]   Did caller verbalize understanding of appointment details? Yes

## 2024-05-20 NOTE — ASSESSMENT & PLAN NOTE
Patient has a persistent 5 cm simple ovarian cyst.  We discussed with the patient there is less than a 1% risk of malignancy.  The patient is minimally symptomatic from this.  We recommended follow-up ultrasound in approximately 1 year.  If the patient develops further symptoms prior to that she will call us.

## 2024-05-20 NOTE — PROGRESS NOTES
Assessment/Plan:    Problem List Items Addressed This Visit       PMB (postmenopausal bleeding)     Patient's postmenopausal bleeding has resolved since her last visit.  Her endometrial stripe is 1 mm.         Left ovarian cyst - Primary     Patient has a persistent 5 cm simple ovarian cyst.  We discussed with the patient there is less than a 1% risk of malignancy.  The patient is minimally symptomatic from this.  We recommended follow-up ultrasound in approximately 1 year.  If the patient develops further symptoms prior to that she will call us.         Relevant Orders    US pelvis complete w transvaginal         CHIEF COMPLAINT: Follow-up postmenopausal bleeding and simple ovarian cyst          Patient ID: Gladys Guillory is a 69 y.o. female  Patient is a very pleasant 69-year-old female with an incidentally identified ovarian cyst.  This was simple in nature and recommended to be followed .  Additionally the patient had vaginal discharge which is stopped after her last visit.    The patient has recently undergone a pelvic ultrasound in follow-up.  This reveals the following:  FINDINGS:     UTERUS:  The uterus is anteverted in position, measuring 6.3 x 2.6 x 4.7 cm.  Myometrium: Normal contour and echogenicity.  No masses.  Cervix: Normal in appearance.        ENDOMETRIUM:  Thickness: 1.0 mm.  Echotexture: Normal and homogenous in echogenicity with no evidence of endometrial mass or fluid collection.     OVARIES/ADNEXA:  Absent.     Left ovary:  6.0 x 4.2 x 4.7 cm. 61.3 mL  No suspicious left ovarian abnormality.  5.1 x 3.7 x 3.2 cm simple unilocular cyst (previously measuring 5.2 x 4.1 x 3.3 cm).  Doppler flow within normal limits.     No suspicious adnexal mass.     FREE FLUID: None.        IMPRESSION:     5.1 x 3.7 x 3.2 cm simple, unilocular left ovarian cyst, not significantly changed in size since a sonogram from 1/18/2024. Based on the ACR O-RADS US v2022 system, this is O-RADS category 2 (almost certain  benign with <1% risk of malignancy.) The   management recommendation in this postmenopausal patient is 12-month follow-up pelvic sonogram.    Today, the patient is doing well.  She denies significant abdominal pain, pelvic pain, nausea, vomiting, constipation, diarrhea, fevers, chills, or vaginal bleeding.             The following portions of the patient's history were reviewed and updated as appropriate: allergies, current medications, past family history, past medical history, past social history, past surgical history, and problem list.    Review of Systems   Constitutional: Negative.    HENT: Negative.     Eyes: Negative.    Respiratory: Negative.     Cardiovascular: Negative.    Gastrointestinal: Negative.    Endocrine: Negative.    Genitourinary: Negative.    Musculoskeletal: Negative.    Skin: Negative.    Neurological: Negative.    Hematological: Negative.    Psychiatric/Behavioral: Negative.         Current Outpatient Medications   Medication Sig Dispense Refill    CALCIUM-VITAMIN D PO Take by mouth      carbidopa-levodopa (SINEMET)  mg per tablet TAKE 1 TABLET AT 8AM,12,4,AND 7:30 PM.THEN 1/2 TABLET AT BEDTIME (Patient taking differently: Take 1 tablet by mouth 5 (five) times a day TAKE 1 TABLET AT 8AM, 1 1/2 tablets at 12noon, 1 tab 4pm, 1 tab at 7:30 PM, 1 tab at 12midnight) 405 tablet 3    fludrocortisone (FLORINEF) 0.1 mg tablet Take 0.1 mg by mouth daily      naproxen sodium (ALEVE) 220 MG tablet as needed       rasagiline (AZILECT) 1 MG TAKE 1 TABLET BY MOUTH EVERY DAY 90 tablet 1    rotigotine (Neupro) 4 MG/24HR        No current facility-administered medications for this visit.           Objective:    Blood pressure 118/70, pulse 72, temperature 97.7 °F (36.5 °C), temperature source Temporal, weight 65.3 kg (144 lb), SpO2 94%, not currently breastfeeding.  Body mass index is 21.9 kg/m².  Body surface area is 1.78 meters squared.    Physical Exam  Constitutional:       Appearance: She is  well-developed.   HENT:      Head: Normocephalic and atraumatic.   Eyes:      Pupils: Pupils are equal, round, and reactive to light.   Cardiovascular:      Rate and Rhythm: Normal rate and regular rhythm.      Heart sounds: Normal heart sounds.   Pulmonary:      Effort: Pulmonary effort is normal. No respiratory distress.      Breath sounds: Normal breath sounds.   Abdominal:      General: Bowel sounds are normal. There is no distension.      Palpations: Abdomen is soft. Abdomen is not rigid.      Tenderness: There is no abdominal tenderness. There is no guarding or rebound.   Genitourinary:     Comments: -Normal external female genitalia, normal Bartholin's and [place]'s glands                  -Normal midline urethral meatus. No lesions notes                  -Bladder without fullness mass or tenderness                  -Vagina without lesion or discharge No significant cystocele or rectocele noted                  -Cervix normal appearing without visible lesions                  -Uterus with normal contour, mobility. No tenderness,                  -Adnexae without  mass or tenderness                  - Anus without fissure of lesion    Musculoskeletal:         General: Normal range of motion.      Cervical back: Normal range of motion and neck supple.   Lymphadenopathy:      Cervical: No cervical adenopathy.      Upper Body:      Right upper body: No supraclavicular adenopathy.      Left upper body: No supraclavicular adenopathy.   Skin:     General: Skin is warm and dry.   Neurological:      Mental Status: She is alert and oriented to person, place, and time.   Psychiatric:         Behavior: Behavior normal.         Lab Results   Component Value Date     18.0 05/13/2024     Lab Results   Component Value Date     01/16/2014    K 3.9 01/26/2023     01/26/2023    CO2 28 01/26/2023    ANIONGAP 7 01/16/2014    BUN 24 01/26/2023    CREATININE 0.66 01/26/2023    GLUCOSE 138 01/16/2014    CALCIUM 8.7  01/26/2023    AST 15 01/26/2023    ALT 7 01/26/2023    ALKPHOS 70 01/26/2023    PROT 7.0 01/16/2014    BILITOT 0.7 01/16/2014    EGFR 95 01/26/2023     Lab Results   Component Value Date    WBC 6.40 12/03/2021    HGB 13.2 12/03/2021    HCT 40.7 12/03/2021     (H) 12/03/2021     12/03/2021     Lab Results   Component Value Date    NEUTROABS 4.45 12/03/2021        Trend:  Lab Results   Component Value Date     18.0 05/13/2024

## 2024-05-23 ENCOUNTER — HOSPITAL ENCOUNTER (OUTPATIENT)
Dept: MRI IMAGING | Facility: HOSPITAL | Age: 69
End: 2024-05-23
Payer: MEDICARE

## 2024-05-23 DIAGNOSIS — Z91.89 INCREASED RISK OF BREAST CANCER: ICD-10-CM

## 2024-05-23 DIAGNOSIS — N60.92 ATYPICAL LOBULAR HYPERPLASIA (ALH) OF LEFT BREAST: ICD-10-CM

## 2024-05-23 DIAGNOSIS — R92.30 DENSE BREASTS: ICD-10-CM

## 2024-05-23 PROCEDURE — C8937 CAD BREAST MRI: HCPCS

## 2024-05-23 PROCEDURE — C8908 MRI W/O FOL W/CONT, BREAST,: HCPCS

## 2024-05-23 PROCEDURE — G1004 CDSM NDSC: HCPCS

## 2024-08-05 ENCOUNTER — OFFICE VISIT (OUTPATIENT)
Dept: SURGICAL ONCOLOGY | Facility: CLINIC | Age: 69
End: 2024-08-05
Payer: MEDICARE

## 2024-08-05 VITALS
WEIGHT: 140.2 LBS | DIASTOLIC BLOOD PRESSURE: 60 MMHG | OXYGEN SATURATION: 95 % | BODY MASS INDEX: 21.25 KG/M2 | RESPIRATION RATE: 16 BRPM | TEMPERATURE: 97.8 F | HEIGHT: 68 IN | SYSTOLIC BLOOD PRESSURE: 96 MMHG | HEART RATE: 70 BPM

## 2024-08-05 DIAGNOSIS — Z80.3 FAMILY HISTORY OF BREAST CANCER: Primary | ICD-10-CM

## 2024-08-05 DIAGNOSIS — N60.92 ATYPICAL LOBULAR HYPERPLASIA (ALH) OF LEFT BREAST: ICD-10-CM

## 2024-08-05 DIAGNOSIS — Z91.89 INCREASED RISK OF BREAST CANCER: ICD-10-CM

## 2024-08-05 DIAGNOSIS — Z12.31 VISIT FOR SCREENING MAMMOGRAM: ICD-10-CM

## 2024-08-05 PROCEDURE — 99213 OFFICE O/P EST LOW 20 MIN: CPT | Performed by: NURSE PRACTITIONER

## 2024-08-05 RX ORDER — MIDODRINE HYDROCHLORIDE 2.5 MG/1
2.5 TABLET ORAL 3 TIMES DAILY
COMMUNITY
Start: 2024-07-03

## 2024-08-05 NOTE — PROGRESS NOTES
Surgical Oncology Follow Up       [address]  [facility] [place]  [address]  [facility] [place] [address]    Gladys Guillory  1955  [id number]      Chief Complaint   Patient presents with    Follow-up       Assessment/Plan:  1. Family history of breast cancer  - 1 year f/u visit  - MRI breast bilateral w and wo contrast w cad; Future    2. Atypical lobular hyperplasia (ALH) of left breast  - MRI breast bilateral w and wo contrast w cad; Future    3. Increased risk of breast cancer  - MRI breast bilateral w and wo contrast w cad; Future  - BUN; Future  - Creatinine, serum; Future    4. Visit for screening mammogram  - Mammo screening bilateral w 3d & cad; Future      Discussion/Summary: Patient is a 69-year-old female with a family history of breast cancer (DCIS) in 2 of her sisters and a personal history atypical lobular hyperplasia that presents today for a follow-up visit secondary to her increased risk.  She underwent an excisional biopsy with Dr. Caballero in December of 2021.  Her surgical pathology revealed multifocal ALH, bordering on LCIS. She has declined a consultation with medical oncology to discuss chemoprevention.   She underwent genetic testing which was negative.  We have recommended screening her with annual 3D mammography, annual breast MRI and clinical breast exams every 6 months.  She had a bilateral 3D screening mammogram in October 2023 which was BI-RADS 1, category 3 density.  She had a bilateral breast MRI in May 2024   which was BI-RADS 1.  She offers no new complaints today and there are no worrisome findings on today's clinical exam.  Prescription given for mammogram and breast MRI.  She will receive a clinical breast exam with her gynecologist in approximately 6-month so I will see her back in 1 year or sooner should the need arise.  She was instructed to contact us with any changes or concerns in the interim.  All of her questions were answered  today.    History of Present Illness:     -Interval History: Patient presents today for follow-up visit.  She has not appreciated any changes on self breast exam.  She reports no changes in her family history.  She had a recent MRI which was BI-RADS 1.    Review of Systems:  Review of Systems   Constitutional:  Negative for chills, fatigue and fever.   Respiratory:  Negative for cough and shortness of breath.    Cardiovascular:  Negative for chest pain.        Reports recent issues with hypotension     Hematological:  Negative for adenopathy.   Psychiatric/Behavioral:  Negative for confusion.        Patient Active Problem List   Diagnosis    Parkinson's disease    Family history of breast cancer    Dense breasts    Low back pain    Breast mass in female    Atypical lobular hyperplasia (ALH) of left breast    Increased risk of breast cancer    PMB (postmenopausal bleeding)    Left ovarian cyst     Past Medical History:   Diagnosis Date    Hypotension     Menopause     Miscarriage 1988    Parkinson disease     PONV (postoperative nausea and vomiting)     Varicella 1961     Past Surgical History:   Procedure Laterality Date    APPENDECTOMY      BACK SURGERY  05/03/2018 2017,2018,2021    BACK SURGERY N/A     2021 Dr. luciano    BREAST BIOPSY Right 2002    Benign    BREAST BIOPSY Left 09/22/2021    BREAST EXCISIONAL BIOPSY Right 1996    Benign    BREAST LUMPECTOMY Left 12/28/2021    Procedure: BREAST NEEDLE LOCALIZED (2 site) LUMPTECTOMY (2 SITE NEEDLE LOC AT 0930);  Surgeon: Jewel Caballero MD;  [facility];  Service: Surgical Oncology    COLONOSCOPY      HYSTEROSCOPY W/ ENDOMETRIAL ABLATION      MAMMO NEEDLE LOCALIZATION LEFT (ALL INC) Left 12/28/2021    MAMMO NEEDLE LOCALIZATION LEFT (ALL INC) EACH ADD Left 12/28/2021    MAMMO STEREOTACTIC BREAST BIOPSY RIGHT (ALL INC) Right     benign    MRI BREAST BIOPSY LEFT (ALL INCLUSIVE) Left 05/26/2023    Benign    REDUCTION MAMMAPLASTY Bilateral     2007     SALPINGOOPHORECTOMY Right 1974    TONSILLECTOMY      US GUIDANCE BREAST BIOPSY LEFT EACH ADDITIONAL Left 2021    US GUIDED BREAST BIOPSY LEFT COMPLETE Left 2021     Family History   Problem Relation Age of Onset    Leukemia Mother 89    Diabetes Mother     Emphysema Father     Stroke Father     No Known Problems Sister     Breast cancer Sister 55    Breast cancer Sister 55    No Known Problems Daughter     No Known Problems Maternal Grandmother     No Known Problems Maternal Grandfather     No Known Problems Paternal Grandmother     No Known Problems Paternal Grandfather     No Known Problems Son     No Known Problems Son     No Known Problems Maternal Aunt     Leukemia Maternal Aunt 22    Cancer Maternal Aunt 22        leukemia    No Known Problems Paternal Aunt     No Known Problems Paternal Aunt     No Known Problems Paternal Aunt      Social History     Socioeconomic History    Marital status: /Civil Union     Spouse name: Not on file    Number of children: Not on file    Years of education: Not on file    Highest education level: Not on file   Occupational History    Not on file   Tobacco Use    Smoking status: Former     Current packs/day: 0.00     Average packs/day: 0.5 packs/day for 1.1 years (0.6 ttl pk-yrs)     Types: Cigarettes     Start date: 1968     Quit date: 1975     Years since quittin.7    Smokeless tobacco: Never    Tobacco comments:     I smoked lightly as a teenager and quit by age 20   Vaping Use    Vaping status: Never Used   Substance and Sexual Activity    Alcohol use: Yes     Alcohol/week: 10.0 standard drinks of alcohol     Types: 10 Glasses of wine per week    Drug use: Not Currently     Types: Marijuana    Sexual activity: Yes     Partners: Male     Birth control/protection: Condom Male, Diaphragm, I.U.D., Post-menopausal, Surgical, Male Sterilization, Female Sterilization     Comment: ablation   Other Topics Concern    Not on file   Social History  Narrative    Not on file     Social Determinants of Health     Financial Resource Strain: Low Risk  (6/24/2024)    Received from [facility]    Overall Financial Resource Strain (CARDIA)     Difficulty of Paying Living Expenses: Not hard at all   Food Insecurity: No Food Insecurity (6/24/2024)    Received from [facility]    Hunger Vital Sign     Worried About Running Out of Food in the Last Year: Never true     Ran Out of Food in the Last Year: Never true   Transportation Needs: No Transportation Needs (6/24/2024)    Received from [facility]    PRAPARE - Transportation     Lack of Transportation (Medical): No     Lack of Transportation (Non-Medical): No   Physical Activity: Not on file   Stress: No Stress Concern Present (6/24/2024)    Received from [facility]    [employer] - Occupational Stress Questionnaire     Feeling of Stress : Only a little   Social Connections: Feeling Socially Integrated (6/24/2024)    Received from [facility]    OASIS : Social Isolation     How often do you feel lonely or isolated from those around you?: Rarely   Intimate Partner Violence: Not At Risk (6/24/2024)    Received from [facility]    Humiliation, Afraid, Rape, and Kick questionnaire     Fear of Current or Ex-Partner: No     Emotionally Abused: No     Physically Abused: No     Sexually Abused: No   Housing Stability: Low Risk  (6/24/2024)    Received from [facility]    Housing Stability Vital Sign     Unable to Pay for Housing in the Last Year: No     Number of Times Moved in the Last Year: 0     Homeless in the Last Year: No       Current Outpatient Medications:     CALCIUM-VITAMIN D PO, Take by mouth, Disp: , Rfl:     carbidopa-levodopa (SINEMET)  mg per tablet, TAKE 1 TABLET AT 8AM,12,4,AND 7:30 PM.THEN 1/2 TABLET AT BEDTIME (Patient taking differently: Take 1  tablet by mouth 5 (five) times a day TAKE 1 TABLET AT 8AM, 1 1/2 tablets at 12noon, 1 tab 4pm, 1 tab at 7:30 PM, 1 tab at 12midnight), Disp: 405 tablet, Rfl: 3    fludrocortisone (FLORINEF) 0.1 mg tablet, Take 0.1 mg by mouth daily, Disp: , Rfl:     midodrine (PROAMATINE) 2.5 mg tablet, Take 2.5 mg by mouth 3 (three) times a day, Disp: , Rfl:     naproxen sodium (ALEVE) 220 MG tablet, as needed , Disp: , Rfl:     rasagiline (AZILECT) 1 MG, TAKE 1 TABLET BY MOUTH EVERY DAY, Disp: 90 tablet, Rfl: 1    rotigotine (Neupro) 4 MG/24HR, , Disp: , Rfl:   Allergies   Allergen Reactions    Penicillins Hives, Itching and Rash    Sulfa Antibiotics Hives, Itching and Rash    Amoxicillin Hives     Vitals:    08/05/24 1342   BP: 96/60   Pulse: 70   Resp: 16   Temp: 97.8 °F (36.6 °C)   SpO2: 95%       Physical Exam  Vitals reviewed.   Constitutional:       General: She is not in acute distress.     Appearance: Normal appearance. She is well-developed. She is not diaphoretic.   HENT:      Head: Normocephalic and atraumatic.   Pulmonary:      Effort: Pulmonary effort is normal.   Chest:   Breasts:     Right: Skin change (reduction scars) present. No swelling, bleeding, inverted nipple, mass, nipple discharge or tenderness.      Left: Skin change (reduction scars) present. No swelling, bleeding, inverted nipple, mass, nipple discharge or tenderness.      Comments: Left upper outer quadrant surgical scar  Abdominal:      Palpations: Abdomen is soft. There is no mass.      Tenderness: There is no abdominal tenderness.   Musculoskeletal:         General: Normal range of motion.      Cervical back: Normal range of motion.   Lymphadenopathy:      Upper Body:      Right upper body: No supraclavicular or axillary adenopathy.      Left upper body: No supraclavicular or axillary adenopathy.   Skin:     General: Skin is warm and dry.      Findings: No rash.   Neurological:      Mental Status: She is alert and oriented to person, place, and  time.      Motor: Tremor present.   Psychiatric:         Speech: Speech normal.         Advance Care Planning/Advance Directives:  Discussed disease status and treatment goals with the patient.

## 2024-10-16 ENCOUNTER — TELEPHONE (OUTPATIENT)
Age: 69
End: 2024-10-16

## 2024-10-16 NOTE — TELEPHONE ENCOUNTER
Incoming call from patient. States that she had DEXA performed - was ordered by PCP. Requesting FABIOLA Cline to review.     Routing to provider for review.

## 2024-10-25 ENCOUNTER — HOSPITAL ENCOUNTER (OUTPATIENT)
Dept: MAMMOGRAPHY | Facility: IMAGING CENTER | Age: 69
Discharge: HOME | End: 2024-10-25
Payer: MEDICARE

## 2024-10-25 VITALS — WEIGHT: 136 LBS | HEIGHT: 68 IN | BODY MASS INDEX: 20.61 KG/M2

## 2024-10-25 DIAGNOSIS — Z12.31 VISIT FOR SCREENING MAMMOGRAM: ICD-10-CM

## 2024-10-25 PROCEDURE — 77063 BREAST TOMOSYNTHESIS BI: CPT

## 2024-10-25 PROCEDURE — 77067 SCR MAMMO BI INCL CAD: CPT

## 2025-02-06 ENCOUNTER — TELEPHONE (OUTPATIENT)
Dept: SURGICAL ONCOLOGY | Facility: CLINIC | Age: 70
End: 2025-02-06

## 2025-04-24 ENCOUNTER — OFFICE VISIT (OUTPATIENT)
Dept: URGENT CARE | Facility: CLINIC | Age: 70
End: 2025-04-24
Payer: MEDICARE

## 2025-04-24 VITALS
BODY MASS INDEX: 22.02 KG/M2 | TEMPERATURE: 98 F | HEART RATE: 78 BPM | WEIGHT: 144.8 LBS | RESPIRATION RATE: 18 BRPM | OXYGEN SATURATION: 99 % | DIASTOLIC BLOOD PRESSURE: 68 MMHG | SYSTOLIC BLOOD PRESSURE: 116 MMHG

## 2025-04-24 DIAGNOSIS — J01.00 ACUTE NON-RECURRENT MAXILLARY SINUSITIS: Primary | ICD-10-CM

## 2025-04-24 PROCEDURE — 99213 OFFICE O/P EST LOW 20 MIN: CPT

## 2025-04-24 PROCEDURE — G0463 HOSPITAL OUTPT CLINIC VISIT: HCPCS

## 2025-04-24 RX ORDER — AZITHROMYCIN 250 MG/1
TABLET, FILM COATED ORAL
Qty: 6 TABLET | Refills: 0 | Status: SHIPPED | OUTPATIENT
Start: 2025-04-24 | End: 2025-04-28

## 2025-04-24 NOTE — PROGRESS NOTES
[facility]        NAME: Gladys Guillory is a 70 y.o. female  : 1955    MRN: [id number]  DATE: 2025  TIME: 5:27 PM    Assessment and Plan   No primary diagnosis found.  No diagnosis found.      Patient Instructions       Follow up with PCP in 3-5 days.  Proceed to  ER if symptoms worsen.    If tests have been performed at [facility], our office will contact you with results if changes need to be made to the care plan discussed with you at the visit.  [facility]    Chief Complaint     Chief Complaint   Patient presents with    Nasal Congestion     Reports green nasal congestion that started 2 weeks ago, also reports b/l ear fullness and tenderness, no medications tried for symptoms, denies other symptoms, concerned for upcoming travel plans         History of Present Illness       HPI    Review of Systems   Review of Systems      Current Medications       Current Outpatient Medications:     CALCIUM-VITAMIN D PO, Take by mouth, Disp: , Rfl:     carbidopa-levodopa (SINEMET)  mg per tablet, TAKE 1 TABLET AT 8AM,12,4,AND 7:30 PM.THEN 1/2 TABLET AT BEDTIME (Patient taking differently: Take 1 tablet by mouth 5 (five) times a day TAKE 1 TABLET AT 8AM, 1 1/2 tablets at 12noon, 1 tab 4pm, 1 tab at 7:30 PM, 1 tab at 12midnight), Disp: 405 tablet, Rfl: 3    fludrocortisone (FLORINEF) 0.1 mg tablet, Take 0.1 mg by mouth daily, Disp: , Rfl:     midodrine (PROAMATINE) 2.5 mg tablet, Take 2.5 mg by mouth 3 (three) times a day, Disp: , Rfl:     naproxen sodium (ALEVE) 220 MG tablet, as needed , Disp: , Rfl:     rasagiline (AZILECT) 1 MG, TAKE 1 TABLET BY MOUTH EVERY DAY, Disp: 90 tablet, Rfl: 1    rotigotine (Neupro) 4 MG/24HR, , Disp: , Rfl:     Current Allergies     Allergies as of 2025 - Reviewed 2025   Allergen Reaction Noted    Penicillins Hives, Itching, and Rash 2015    Sulfa antibiotics Hives, Itching, and Rash 2015     Amoxicillin Hives 08/14/2015            The following portions of the patient's history were reviewed and updated as appropriate: allergies, current medications, past family history, past medical history, past social history, past surgical history and problem list.     Past Medical History:   Diagnosis Date    Hypotension     Menopause     Miscarriage 1988    Parkinson disease (HCC)     PONV (postoperative nausea and vomiting)     Varicella 1961       Past Surgical History:   Procedure Laterality Date    APPENDECTOMY      BACK SURGERY  05/03/2018 2017,2018,2021    BACK SURGERY N/A     2021 Dr. luciano    BREAST BIOPSY Right 2002    Benign    BREAST BIOPSY Left 09/22/2021    BREAST EXCISIONAL BIOPSY Right 1996    Benign    BREAST LUMPECTOMY Left 12/28/2021    Procedure: BREAST NEEDLE LOCALIZED (2 site) LUMPTECTOMY (2 SITE NEEDLE LOC AT 0930);  Surgeon: Jewel Caballero MD;  [facility];  Service: Surgical Oncology    COLONOSCOPY      HYSTEROSCOPY W/ ENDOMETRIAL ABLATION      MAMMO NEEDLE LOCALIZATION LEFT (ALL INC) Left 12/28/2021    MAMMO NEEDLE LOCALIZATION LEFT (ALL INC) EACH ADD Left 12/28/2021    MAMMO STEREOTACTIC BREAST BIOPSY RIGHT (ALL INC) Right     benign    MRI BREAST BIOPSY LEFT (ALL INCLUSIVE) Left 05/26/2023    Benign    REDUCTION MAMMAPLASTY Bilateral     2007    SALPINGOOPHORECTOMY Right 1974    TONSILLECTOMY  1961    US GUIDANCE BREAST BIOPSY LEFT EACH ADDITIONAL Left 09/22/2021    US GUIDED BREAST BIOPSY LEFT COMPLETE Left 09/22/2021       Family History   Problem Relation Age of Onset    Leukemia Mother 89    Diabetes Mother     Emphysema Father     Stroke Father     No Known Problems Sister     Breast cancer Sister 55    Breast cancer Sister 55    No Known Problems Daughter     No Known Problems Maternal Grandmother     No Known Problems Maternal Grandfather     No Known Problems Paternal Grandmother     No Known Problems Paternal Grandfather     No Known Problems Son     No Known Problems  Son     No Known Problems Maternal Aunt     Leukemia Maternal Aunt 22    Cancer Maternal Aunt 22        leukemia    No Known Problems Paternal Aunt     No Known Problems Paternal Aunt     No Known Problems Paternal Aunt          Medications have been verified.        Objective   /68 (BP Location: Right arm, Patient Position: Sitting)   Pulse 78   Temp 98 °F (36.7 °C) (Tympanic)   Resp 18   Wt 65.7 kg (144 lb 12.8 oz)   LMP  (LMP Unknown)   SpO2 99%   BMI 22.02 kg/m²   No LMP recorded (lmp unknown). Patient is postmenopausal.       Physical Exam     Physical Exam

## 2025-04-24 NOTE — PATIENT INSTRUCTIONS
Saline nasal spray as often as needed  Flonase nasal spray 1 spray to each nostril daily  Mucinex in the blue box    Take the antibiotic as prescribed  Increase your fluids

## 2025-04-24 NOTE — PROGRESS NOTES
[facility]        NAME: Gladys Guillory is a 70 y.o. female  : 1955    MRN: [id number]  DATE: 2025  TIME: 5:35 PM    Assessment and Plan   Acute non-recurrent maxillary sinusitis [J01.00]  1. Acute non-recurrent maxillary sinusitis  azithromycin (ZITHROMAX) 250 mg tablet            Patient Instructions   Saline nasal spray as often as needed  Flonase nasal spray 1 spray to each nostril daily  Mucinex in the blue box    Take the antibiotic as prescribed  Increase your fluids    Follow up with PCP in 3-5 days.  Proceed to  ER if symptoms worsen.    If tests have been performed at [facility], our office will contact you with results if changes need to be made to the care plan discussed with you at the visit.  [facility]    Chief Complaint     Chief Complaint   Patient presents with    Nasal Congestion     Reports green nasal congestion that started 2 weeks ago, also reports b/l ear fullness and tenderness, no medications tried for symptoms, denies other symptoms, concerned for upcoming travel plans         History of Present Illness       This is a 70-year-old female who presents today with 2-week history of sinus congestion.  She states she is blowing out yellow and green mucus from her nose.  She denies any seasonal allergies.  She states she feels fatigued she denies any exposure to any other sick people.  Patient states she does have a history of sinus infections.  She does have sinus pain and pressure to palpation.        Review of Systems   Review of Systems   Constitutional:  Negative for chills, fatigue and fever.   HENT:  Positive for congestion and postnasal drip. Negative for ear discharge, ear pain, sinus pressure, sinus pain and sore throat.    Eyes:  Negative for pain and discharge.   Respiratory:  Positive for cough. Negative for shortness of breath.    Cardiovascular:  Negative for chest pain and palpitations.   Gastrointestinal:   Negative for abdominal pain, diarrhea, nausea and vomiting.   Genitourinary:  Negative for difficulty urinating and dysuria.   Musculoskeletal:  Negative for arthralgias and myalgias.   Skin:  Negative for rash.   Neurological:  Negative for dizziness, syncope, light-headedness, numbness and headaches.   Psychiatric/Behavioral:  Negative for agitation.    All other systems reviewed and are negative.        Current Medications       Current Outpatient Medications:     azithromycin (ZITHROMAX) 250 mg tablet, Take 2 tablets today then 1 tablet daily x 4 days, Disp: 6 tablet, Rfl: 0    CALCIUM-VITAMIN D PO, Take by mouth, Disp: , Rfl:     carbidopa-levodopa (SINEMET)  mg per tablet, TAKE 1 TABLET AT 8AM,12,4,AND 7:30 PM.THEN 1/2 TABLET AT BEDTIME (Patient taking differently: Take 1 tablet by mouth 5 (five) times a day TAKE 1 TABLET AT 8AM, 1 1/2 tablets at 12noon, 1 tab 4pm, 1 tab at 7:30 PM, 1 tab at 12midnight), Disp: 405 tablet, Rfl: 3    fludrocortisone (FLORINEF) 0.1 mg tablet, Take 0.1 mg by mouth daily, Disp: , Rfl:     midodrine (PROAMATINE) 2.5 mg tablet, Take 2.5 mg by mouth 3 (three) times a day, Disp: , Rfl:     naproxen sodium (ALEVE) 220 MG tablet, as needed , Disp: , Rfl:     rasagiline (AZILECT) 1 MG, TAKE 1 TABLET BY MOUTH EVERY DAY, Disp: 90 tablet, Rfl: 1    rotigotine (Neupro) 4 MG/24HR, , Disp: , Rfl:     Current Allergies     Allergies as of 04/24/2025 - Reviewed 04/24/2025   Allergen Reaction Noted    Penicillins Hives, Itching, and Rash 08/14/2015    Sulfa antibiotics Hives, Itching, and Rash 08/14/2015    Amoxicillin Hives 08/14/2015            The following portions of the patient's history were reviewed and updated as appropriate: allergies, current medications, past family history, past medical history, past social history, past surgical history and problem list.     Past Medical History:   Diagnosis Date    Hypotension     Menopause     Miscarriage 1988    Parkinson disease (HCC)      PONV (postoperative nausea and vomiting)     Varicella 1961       Past Surgical History:   Procedure Laterality Date    APPENDECTOMY      BACK SURGERY  05/03/2018 2017,2018,2021    BACK SURGERY N/A     2021 Dr. luciano    BREAST BIOPSY Right 2002    Benign    BREAST BIOPSY Left 09/22/2021    BREAST EXCISIONAL BIOPSY Right 1996    Benign    BREAST LUMPECTOMY Left 12/28/2021    Procedure: BREAST NEEDLE LOCALIZED (2 site) LUMPTECTOMY (2 SITE NEEDLE LOC AT 0930);  Surgeon: Jewel Caballero MD;  [facility];  Service: Surgical Oncology    COLONOSCOPY      HYSTEROSCOPY W/ ENDOMETRIAL ABLATION      MAMMO NEEDLE LOCALIZATION LEFT (ALL INC) Left 12/28/2021    MAMMO NEEDLE LOCALIZATION LEFT (ALL INC) EACH ADD Left 12/28/2021    MAMMO STEREOTACTIC BREAST BIOPSY RIGHT (ALL INC) Right     benign    MRI BREAST BIOPSY LEFT (ALL INCLUSIVE) Left 05/26/2023    Benign    REDUCTION MAMMAPLASTY Bilateral     2007    SALPINGOOPHORECTOMY Right 1974    TONSILLECTOMY  1961    US GUIDANCE BREAST BIOPSY LEFT EACH ADDITIONAL Left 09/22/2021    US GUIDED BREAST BIOPSY LEFT COMPLETE Left 09/22/2021       Family History   Problem Relation Age of Onset    Leukemia Mother 89    Diabetes Mother     Emphysema Father     Stroke Father     No Known Problems Sister     Breast cancer Sister 55    Breast cancer Sister 55    No Known Problems Daughter     No Known Problems Maternal Grandmother     No Known Problems Maternal Grandfather     No Known Problems Paternal Grandmother     No Known Problems Paternal Grandfather     No Known Problems Son     No Known Problems Son     No Known Problems Maternal Aunt     Leukemia Maternal Aunt 22    Cancer Maternal Aunt 22        leukemia    No Known Problems Paternal Aunt     No Known Problems Paternal Aunt     No Known Problems Paternal Aunt          Medications have been verified.        Objective   /68 (BP Location: Right arm, Patient Position: Sitting)   Pulse 78   Temp 98 °F (36.7 °C) (Tympanic)    Resp 18   Wt 65.7 kg (144 lb 12.8 oz)   LMP  (LMP Unknown)   SpO2 99%   BMI 22.02 kg/m²   No LMP recorded (lmp unknown). Patient is postmenopausal.       Physical Exam     Physical Exam  Constitutional:       Appearance: Normal appearance.   HENT:      Head: Normocephalic and atraumatic.      Right Ear: Tympanic membrane, ear canal and external ear normal.      Left Ear: Tympanic membrane, ear canal and external ear normal.      Nose: Congestion present.      Mouth/Throat:      Mouth: Mucous membranes are moist.      Pharynx: Oropharynx is clear. No posterior oropharyngeal erythema.   Eyes:      Conjunctiva/sclera: Conjunctivae normal.      Pupils: Pupils are equal, round, and reactive to light.   Cardiovascular:      Rate and Rhythm: Normal rate and regular rhythm.      Pulses: Normal pulses.      Heart sounds: Normal heart sounds.   Pulmonary:      Effort: Pulmonary effort is normal. No respiratory distress.      Breath sounds: Normal breath sounds. No wheezing, rhonchi or rales.   Abdominal:      General: Abdomen is flat. Bowel sounds are normal.   Musculoskeletal:         General: Normal range of motion.   Skin:     General: Skin is warm and dry.      Capillary Refill: Capillary refill takes less than 2 seconds.   Neurological:      General: No focal deficit present.      Mental Status: She is alert. Mental status is at baseline.   Psychiatric:         Mood and Affect: Mood normal.         Thought Content: Thought content normal.         Judgment: Judgment normal.

## 2025-05-19 ENCOUNTER — HOSPITAL ENCOUNTER (OUTPATIENT)
Dept: ULTRASOUND IMAGING | Facility: HOSPITAL | Age: 70
Discharge: HOME | End: 2025-05-19
Payer: MEDICARE

## 2025-05-19 DIAGNOSIS — N83.202 LEFT OVARIAN CYST: ICD-10-CM

## 2025-05-19 PROCEDURE — 76830 TRANSVAGINAL US NON-OB: CPT

## 2025-05-19 PROCEDURE — 76856 US EXAM PELVIC COMPLETE: CPT

## 2025-05-28 ENCOUNTER — TELEPHONE (OUTPATIENT)
Dept: GYNECOLOGY | Facility: CLINIC | Age: 70
End: 2025-05-28

## 2025-05-28 NOTE — TELEPHONE ENCOUNTER
Patient calling to see if she has had genetic testing done before, for breast cancer. Daughter was recently diagnosed with breast cancer. Warm transfer to [facility], unsuccessful. Please call patient back.     Adv Gyn last visit 12/13/22

## 2025-05-28 NOTE — TELEPHONE ENCOUNTER
Called patient back, reviewed she did have testing done and it was negative.  No further questions.

## 2025-06-02 ENCOUNTER — OFFICE VISIT (OUTPATIENT)
Dept: GYNECOLOGIC ONCOLOGY | Facility: CLINIC | Age: 70
End: 2025-06-02
Payer: MEDICARE

## 2025-06-02 VITALS
BODY MASS INDEX: 21.67 KG/M2 | DIASTOLIC BLOOD PRESSURE: 70 MMHG | SYSTOLIC BLOOD PRESSURE: 100 MMHG | WEIGHT: 143 LBS | TEMPERATURE: 97.6 F | RESPIRATION RATE: 18 BRPM | HEIGHT: 68 IN

## 2025-06-02 DIAGNOSIS — N83.202 LEFT OVARIAN CYST: Primary | ICD-10-CM

## 2025-06-02 PROCEDURE — 99213 OFFICE O/P EST LOW 20 MIN: CPT | Performed by: OBSTETRICS & GYNECOLOGY

## 2025-06-02 NOTE — PROGRESS NOTES
Name: Gladys Guillory      : 1955      MRN: [id number]  Encounter Provider: Carson Mendez MD  Encounter Date: 2025   Encounter department: [facility]  :  Assessment & Plan  Left ovarian cyst  Persistent and unchanged.  It has been followed for a little over a year.  Overall risk of malignancy remains less than 1%.  Will repeat ultrasound in the year if this improves or stays stable we could consider discontinuing follow-up.  Orders:    US pelvis complete w transvaginal; Future            History of Present Illness   Reason for Visit / CC: Follow-up ovarian cyst   Gladys Guillory is a 70 y.o. female   Patient is a very pleasant 70-year-old female with a history of approximately 5 cm simple ovarian cyst.  This has been persistent since 2024.  Patient was recommended follow-up and reports 1 year later for further follow-up.  Most recent ultrasound reveals the following:  Narrative & Impression  PELVIC ULTRASOUND, COMPLETE     INDICATION: The patient is 70 years old. N83.202: Unspecified ovarian cyst, left side.     COMPARISON: 2024, 2024     TECHNIQUE: Transabdominal pelvic ultrasound was performed in sagittal and transverse planes with a curvilinear transducer. Additional transvaginal imaging was performed to better evaluate the endometrium and ovaries. Imaging included volumetric sweeps as   well as traditional still imaging technique.     FINDINGS:     UTERUS:  The uterus is anteverted in position, measuring 5.4 x 2.5 x 4.3 cm.  The uterus has a normal contour with a heterogeneous echotexture.  The cervix appears within normal limits.     ENDOMETRIUM:  The endometrial echo complex has an AP caliber of 1.0 mm.  Appearance within normal limits.     OVARIES/ADNEXA:  Right ovary: Surgically absent     Left ovary: 5.7 x 4.4 x 4.1 cm. 54.8 mL.  Ovarian Doppler flow is within normal limits.  There is a 5.3 x 3.6 x 3.5 cm nearly anechoic cystic focus of  "the left ovary without vascularity, unilocular. No significant interval change in size. No suspicious ovarian or adnexal abnormality.     OTHER:  No free fluid or loculated fluid collections.     IMPRESSION:     5.3 cm unilocular left ovarian cyst, not significantly changed. O-RADS 2 = Almost certainly benign. Follow-up ultrasound in 12 months.        Today, the patient is doing well.  She denies significant abdominal pain, pelvic pain, nausea, vomiting, constipation, diarrhea, fevers, chills, or vaginal bleeding.  The patient's daughter has recently been diagnosed with stage III breast cancer and is initiating treatment next week.  She has 37.         Pertinent Medical History            Review of Systems A complete review of systems is negative other than that noted above in the HPI.       Objective   /70 (BP Location: Left arm, Patient Position: Sitting, Cuff Size: Adult)   Temp 97.6 °F (36.4 °C)   Resp 18   Ht 5' 8\" (1.727 m)   Wt 64.9 kg (143 lb)   LMP  (LMP Unknown)   BMI 21.74 kg/m²     Body mass index is 21.74 kg/m².  Pain Screening:  Pain Score: 0-No pain  ECOG   0  Physical Exam  Constitutional:       Appearance: She is well-developed.   HENT:      Head: Normocephalic and atraumatic.     Eyes:      Pupils: Pupils are equal, round, and reactive to light.       Cardiovascular:      Rate and Rhythm: Normal rate and regular rhythm.      Heart sounds: Normal heart sounds.   Pulmonary:      Effort: Pulmonary effort is normal. No respiratory distress.      Breath sounds: Normal breath sounds.   Abdominal:      General: Bowel sounds are normal. There is no distension.      Palpations: Abdomen is soft. Abdomen is not rigid.      Tenderness: There is no abdominal tenderness. There is no guarding or rebound.   Genitourinary:     Comments: -Normal external female genitalia, normal Bartholin's and [place]'s glands                  -Normal midline urethral meatus. No lesions notes                  -Bladder " "without fullness mass or tenderness                  -Vagina without lesion or discharge No significant cystocele or rectocele noted                  -Cervix normal appearing without visible lesions                  -Uterus with normal contour, mobility. No tenderness,                  -Adnexae with palpable 5 cm smooth-walled nontender mobile left ovarian cyst                  - Anus without fissure of lesion      Musculoskeletal:         General: Normal range of motion.      Cervical back: Normal range of motion and neck supple.   Lymphadenopathy:      Cervical: No cervical adenopathy.      Upper Body:      Right upper body: No supraclavicular adenopathy.      Left upper body: No supraclavicular adenopathy.     Skin:     General: Skin is warm and dry.     Neurological:      Mental Status: She is alert and oriented to person, place, and time.     Psychiatric:         Behavior: Behavior normal.          Labs: I have reviewed pertinent labs.   No results found for: \"\"  No results found for: \"NA\", \"K\", \"CL\", \"CO2\", \"ANIONGAP\", \"BUN\", \"CREATININE\", \"GLUCOSE\", \"GLUF\", \"CALCIUM\", \"CORRECTEDCA\", \"AST\", \"ALT\", \"ALKPHOS\", \"PROT\", \"BILITOT\", \"EGFR\"  No results found for: \"WBC\", \"HGB\", \"HCT\", \"MCV\", \"PLT\"  No results found for: \"NEUTROABS\"     Trend:  Lab Results   Component Value Date     18.0 05/13/2024               "

## 2025-06-02 NOTE — ASSESSMENT & PLAN NOTE
Persistent and unchanged.  It has been followed for a little over a year.  Overall risk of malignancy remains less than 1%.  Will repeat ultrasound in the year if this improves or stays stable we could consider discontinuing follow-up.  Orders:    US pelvis complete w transvaginal; Future

## 2025-06-11 ENCOUNTER — HOSPITAL ENCOUNTER (OUTPATIENT)
Dept: MRI IMAGING | Facility: HOSPITAL | Age: 70
Discharge: HOME | End: 2025-06-11
Payer: MEDICARE

## 2025-06-11 DIAGNOSIS — Z80.3 FAMILY HISTORY OF BREAST CANCER: ICD-10-CM

## 2025-06-11 DIAGNOSIS — Z91.89 INCREASED RISK OF BREAST CANCER: ICD-10-CM

## 2025-06-11 DIAGNOSIS — N60.92 ATYPICAL LOBULAR HYPERPLASIA (ALH) OF LEFT BREAST: ICD-10-CM

## 2025-06-11 PROCEDURE — C8908 MRI W/O FOL W/CONT, BREAST,: HCPCS

## 2025-06-11 PROCEDURE — C8937 CAD BREAST MRI: HCPCS

## 2025-06-11 PROCEDURE — A9585 GADOBUTROL INJECTION: HCPCS | Performed by: NURSE PRACTITIONER

## 2025-06-11 RX ORDER — GADOBUTROL 604.72 MG/ML
6 INJECTION INTRAVENOUS
Status: COMPLETED | OUTPATIENT
Start: 2025-06-11 | End: 2025-06-11

## 2025-06-11 RX ADMIN — GADOBUTROL 6 ML: 604.72 INJECTION INTRAVENOUS at 13:15

## 2025-06-12 ENCOUNTER — RESULTS FOLLOW-UP (OUTPATIENT)
Dept: SURGICAL ONCOLOGY | Facility: CLINIC | Age: 70
End: 2025-06-12

## (undated) DEVICE — GAUZE SPONGES,USP TYPE VII GAUZE, 12 PLY: Brand: CURITY

## (undated) DEVICE — PLUMEPEN PRO 10FT

## (undated) DEVICE — PAD GROUNDING ADULT

## (undated) DEVICE — SUT VICRYL 3-0 SH 27 IN J416H

## (undated) DEVICE — MEDI-VAC YANK SUCT HNDL W/TPRD BULBOUS TIP: Brand: CARDINAL HEALTH

## (undated) DEVICE — GLOVE INDICATOR PI UNDERGLOVE SZ 8 BLUE

## (undated) DEVICE — TUBING SUCTION 5MM X 12 FT

## (undated) DEVICE — BETHLEHEM UNIVERSAL MINOR GEN: Brand: CARDINAL HEALTH

## (undated) DEVICE — SUT SILK 2-0 SH 30 IN K833H

## (undated) DEVICE — GLOVE SRG BIOGEL ECLIPSE 8

## (undated) DEVICE — NEEDLE 25G X 1 1/2

## (undated) DEVICE — LIGHT HANDLE COVER SLEEVE DISP BLUE STELLAR

## (undated) DEVICE — INTENDED FOR TISSUE SEPARATION, AND OTHER PROCEDURES THAT REQUIRE A SHARP SURGICAL BLADE TO PUNCTURE OR CUT.: Brand: BARD-PARKER SAFETY BLADES SIZE 15, STERILE

## (undated) DEVICE — 3M™ TEGADERM™ TRANSPARENT FILM DRESSING FRAME STYLE, 1626W, 4 IN X 4-3/4 IN (10 CM X 12 CM), 50/CT 4CT/CASE: Brand: 3M™ TEGADERM™

## (undated) DEVICE — 3M™ STERI-STRIP™ REINFORCED ADHESIVE SKIN CLOSURES, R1547, 1/2 IN X 4 IN (12 MM X 100 MM), 6 STRIPS/ENVELOPE: Brand: 3M™ STERI-STRIP™

## (undated) DEVICE — VIAL DECANTER

## (undated) DEVICE — SUT MONOCRYL 4-0 PS-2 18 IN Y496G

## (undated) DEVICE — SUT VICRYL 2-0 18 IN J911T

## (undated) DEVICE — CHLORAPREP HI-LITE 26ML ORANGE